# Patient Record
Sex: MALE | Race: WHITE | NOT HISPANIC OR LATINO | Employment: OTHER | URBAN - NONMETROPOLITAN AREA
[De-identification: names, ages, dates, MRNs, and addresses within clinical notes are randomized per-mention and may not be internally consistent; named-entity substitution may affect disease eponyms.]

---

## 2017-10-10 ENCOUNTER — OFFICE VISIT (OUTPATIENT)
Dept: URGENT CARE | Facility: CLINIC | Age: 77
End: 2017-10-10
Payer: MEDICARE

## 2017-10-10 PROCEDURE — G0463 HOSPITAL OUTPT CLINIC VISIT: HCPCS

## 2017-10-10 PROCEDURE — 99203 OFFICE O/P NEW LOW 30 MIN: CPT

## 2017-10-14 NOTE — PROGRESS NOTES
Assessment  1  Contact dermatitis due to poison ivy (692 6) (L23 7)    Plan  Contact dermatitis due to poison ivy    · MethylPREDNISolone 4 MG Oral Tablet Therapy Pack (Medrol); 24mg day 1 then  decrease by 4 mg po daily for 6 days    Discussion/Summary  Discussion Summary:   Discussed dx of poision ivy will treat with medrol dose pack and follow up with PCP in 1-2 days  Medication Side Effects Reviewed: Possible side effects of new medications were reviewed with the patient/guardian today  Understands and agrees with treatment plan: The treatment plan was reviewed with the patient/guardian  The patient/guardian understands and agrees with the treatment plan   Counseling Documentation With Imm: The patient was counseled regarding instructions for management,-- patient and family education,-- importance of compliance with treatment  total time of encounter was 25 minutes-- and-- 10 minutes was spent counseling  Follow Up Instructions: Follow Up with your Primary Care Provider in 1-2 days  If your symptoms worsen, go to the nearest [place] [address] Emergency Department  Chief Complaint  1  Rash    History of Present Illness  HPI: 68year old male at urgent care with chief complaint of red raised rash to bilateral forearms for 2 days since cutting [place] busches [place] his property  He has been using OTC benadryl no improvement noted  Hospital Based Practices Required Assessment:   Pain Assessment   the patient states they do not have pain  Abuse And Domestic Violence Screen    Yes, the patient is safe at home  -- The patient states no one is hurting them  Depression And Suicide Screen  No, the patient has not had thoughts of hurting themself  No, the patient has not felt depressed in the past 7 days  Prefered Language is  [place]  Primary Language is  English  Readiness To Learn: Receptive  Barriers To Learning: none     Preferred Learning: verbal   Education Completed: disease/condition,-- medications-- and-- further treatment/follow-up   Teaching Method: verbal   Person Taught: patient   Evaluation Of Learning: verbalized/demonstrated understanding   Rash: Laura Haney presents with complaints of rash  Associated symptoms include skin blistering,-- skin bumps,-- pruritus-- and-- skin redness, but-- no cracking,-- no crusting,-- no draining,-- no skin dryness,-- no skin oiliness,-- no pain,-- no skin scaling,-- no skin swelling,-- no skin ulceration,-- no skin weeping,-- no excoriations,-- no fever,-- no fissuring,-- no nausea,-- no pustules,-- no purulent drainage-- and-- no serous discharge  Review of Systems  Focused-Male:   Constitutional: no fever or chills, feels well, no tiredness, no recent weight loss or weight gain  ENT: no complaints of earache, no loss of hearing, no nosebleeds or nasal discharge, no sore throat or hoarseness  Cardiovascular: no complaints of slow or fast heart rate, no chest pain, no palpitations, no leg claudication or lower extremity edema  Respiratory: no complaints of shortness of breath, no wheezing or cough, no dyspnea on exertion, no orthopnea or PND  Gastrointestinal: no complaints of abdominal pain, no constipation, no nausea or vomiting, no diarrhea or bloody stools  Genitourinary: no complaints of dysuria or incontinence, no hesitancy, no nocturia, no genital lesion, no inadequacy of penile erection  Musculoskeletal: no complaints of arthralgia, no myalgia, no joint swelling or stiffness, no limb pain or swelling  Integumentary: a rash, but-- as noted in HPI  Neurological: no complaints of headache, no confusion, no numbness or tingling, no dizziness or fainting  ROS Reviewed:   ROS reviewed  Active Problems  1  Encounter for removal of sutures (E71 32) (Z48 02)    Past Medical History  1  Encounter for examination for driving license (Z69 9) (X08 2)   2   Encounter for removal of sutures (V58 32) (Z48 02)  Active Problems And Past Medical History Reviewed: The active problems and past medical history were reviewed and updated today  Family History  Father    1  Family history of acute myocardial infarction (V17 3) (Z82 49)  Family History Reviewed: The family history was reviewed and updated today  Social History   · Being A Social Drinker   · Never A Smoker  Social History Reviewed: The social history was reviewed and updated today  The social history was reviewed and is unchanged  Surgical History  1  History of Hernia Repair   2  History Of Prior Surgery   3  History of Tonsillectomy With Adenoidectomy  Surgical History Reviewed: The surgical history was reviewed and updated today  Current Meds   1  Aspirin 81 MG Oral Tablet Delayed Release; Take 1 tablet daily as directed Recorded   2  Ibuprofen TABS; Therapy: (Recorded:10Oct2017) to Recorded  Medication List Reviewed: The medication list was reviewed and updated today  Allergies  1  No Known Drug Allergies    Vitals  Signs   Recorded: [id number] 07:28PM   Temperature: 98 5 F  Heart Rate: 100  Respiration: 20  Systolic: 179  Diastolic: 82  O2 Saturation: 96    Physical Exam    Constitutional   General appearance: No acute distress, well appearing and well nourished  Eyes   Conjunctiva and lids: No swelling, erythema, or discharge  Pupils and irises: Equal, round and reactive to light  Ears, Nose, Mouth, and Throat   External inspection of ears and nose: Normal     Otoscopic examination: Tympanic membrance translucent with normal light reflex  Canals patent without erythema  Nasal mucosa, septum, and turbinates: Normal without edema or erythema  Oropharynx: Normal with no erythema, edema, exudate or lesions  Pulmonary   Respiratory effort: No increased work of breathing or signs of respiratory distress  Auscultation of lungs: Clear to auscultation  Cardiovascular   Palpation of heart: Normal PMI, no thrills      Auscultation of heart: Normal rate and rhythm, normal S1 and S2, without murmurs  Examination of extremities for edema and/or varicosities: Normal     Abdomen   Abdomen: Non-tender, no masses  Liver and spleen: No hepatomegaly or splenomegaly  Lymphatic   Palpation of lymph nodes in neck: No lymphadenopathy  Musculoskeletal   Gait and station: Normal     Digits and nails: Normal without clubbing or cyanosis  Inspection/palpation of joints, bones, and muscles: Normal     Skin   Skin and subcutaneous tissue: Abnormal   Clinical impression: contact dermatitis (on a non-weight bearing surface and on both forearms )  Neurologic   Cranial nerves: Cranial nerves 2-12 intact  Reflexes: 2+ and symmetric  Sensation: No sensory loss      Psychiatric   Orientation to person, place and time: Normal     Mood and affect: Normal        Signatures   Electronically signed by : Katherine Hansen NP; Oct 10 2017  7:38PM EST                       (Author)    Electronically signed by : APOLINAR Hurtado ; Oct 13 2017  1:22PM EST                       (Co-author)

## 2019-02-28 ENCOUNTER — HOSPITAL ENCOUNTER (EMERGENCY)
Facility: HOSPITAL | Age: 79
End: 2019-02-28
Attending: EMERGENCY MEDICINE | Admitting: EMERGENCY MEDICINE
Payer: MEDICARE

## 2019-02-28 ENCOUNTER — APPOINTMENT (EMERGENCY)
Dept: CT IMAGING | Facility: HOSPITAL | Age: 79
End: 2019-02-28
Payer: MEDICARE

## 2019-02-28 ENCOUNTER — HOSPITAL ENCOUNTER (INPATIENT)
Facility: HOSPITAL | Age: 79
LOS: 1 days | Discharge: HOME | DRG: 200 | End: 2019-03-02
Attending: SURGERY | Admitting: SURGERY
Payer: MEDICARE

## 2019-02-28 ENCOUNTER — APPOINTMENT (EMERGENCY)
Dept: RADIOLOGY | Facility: HOSPITAL | Age: 79
End: 2019-02-28
Payer: MEDICARE

## 2019-02-28 VITALS
DIASTOLIC BLOOD PRESSURE: 71 MMHG | HEART RATE: 62 BPM | OXYGEN SATURATION: 96 % | RESPIRATION RATE: 18 BRPM | BODY MASS INDEX: 24.82 KG/M2 | SYSTOLIC BLOOD PRESSURE: 120 MMHG | WEIGHT: 167.55 LBS | TEMPERATURE: 97.7 F | HEIGHT: 69 IN

## 2019-02-28 DIAGNOSIS — S22.49XA RIB FRACTURES: ICD-10-CM

## 2019-02-28 DIAGNOSIS — J94.2 HEMOTHORAX ON LEFT: Primary | ICD-10-CM

## 2019-02-28 DIAGNOSIS — S22.42XD CLOSED FRACTURE OF MULTIPLE RIBS OF LEFT SIDE WITH ROUTINE HEALING, SUBSEQUENT ENCOUNTER: Primary | ICD-10-CM

## 2019-02-28 PROBLEM — S22.42XA CLOSED FRACTURE OF MULTIPLE RIBS OF LEFT SIDE: Status: ACTIVE | Noted: 2019-02-28

## 2019-02-28 LAB
ALBUMIN SERPL BCP-MCNC: 3.7 G/DL (ref 3.5–5)
ALP SERPL-CCNC: 66 U/L (ref 46–116)
ALT SERPL W P-5'-P-CCNC: 21 U/L (ref 12–78)
ANION GAP BLD CALC-SCNC: 15 MMOL/L (ref 4–13)
ANION GAP SERPL CALCULATED.3IONS-SCNC: 6 MMOL/L (ref 4–13)
APTT PPP: 30 SECONDS (ref 26–38)
AST SERPL W P-5'-P-CCNC: 26 U/L (ref 5–45)
ATRIAL RATE: 67 BPM
BASOPHILS # BLD AUTO: 0.03 THOUSANDS/ΜL (ref 0–0.1)
BASOPHILS NFR BLD AUTO: 0 % (ref 0–1)
BILIRUB SERPL-MCNC: 0.5 MG/DL (ref 0.2–1)
BUN BLD-MCNC: 28 MG/DL (ref 5–25)
BUN SERPL-MCNC: 24 MG/DL (ref 5–25)
CA-I BLD-SCNC: 1.12 MMOL/L (ref 1.12–1.32)
CALCIUM SERPL-MCNC: 8.7 MG/DL (ref 8.3–10.1)
CHLORIDE BLD-SCNC: 109 MMOL/L (ref 100–108)
CHLORIDE SERPL-SCNC: 105 MMOL/L (ref 100–108)
CO2 SERPL-SCNC: 28 MMOL/L (ref 21–32)
CREAT BLD-MCNC: 1 MG/DL (ref 0.6–1.3)
CREAT SERPL-MCNC: 1.11 MG/DL (ref 0.6–1.3)
EOSINOPHIL # BLD AUTO: 0.12 THOUSAND/ΜL (ref 0–0.61)
EOSINOPHIL NFR BLD AUTO: 1 % (ref 0–6)
ERYTHROCYTE [DISTWIDTH] IN BLOOD BY AUTOMATED COUNT: 13.4 % (ref 11.6–15.1)
GFR SERPL CREATININE-BSD FRML MDRD: 63 ML/MIN/1.73SQ M
GFR SERPL CREATININE-BSD FRML MDRD: 71 ML/MIN/1.73SQ M
GLUCOSE SERPL-MCNC: 138 MG/DL (ref 65–140)
GLUCOSE SERPL-MCNC: 139 MG/DL (ref 65–140)
HCT VFR BLD AUTO: 45 % (ref 36.5–49.3)
HCT VFR BLD CALC: 43 % (ref 36.5–49.3)
HGB BLD-MCNC: 14.2 G/DL (ref 12–17)
HGB BLDA-MCNC: 14.6 G/DL (ref 12–17)
IMM GRANULOCYTES # BLD AUTO: 0.06 THOUSAND/UL (ref 0–0.2)
IMM GRANULOCYTES NFR BLD AUTO: 0 % (ref 0–2)
INR PPP: 1.05 (ref 0.86–1.17)
LYMPHOCYTES # BLD AUTO: 2.51 THOUSANDS/ΜL (ref 0.6–4.47)
LYMPHOCYTES NFR BLD AUTO: 16 % (ref 14–44)
MCH RBC QN AUTO: 29.8 PG (ref 26.8–34.3)
MCHC RBC AUTO-ENTMCNC: 31.6 G/DL (ref 31.4–37.4)
MCV RBC AUTO: 94 FL (ref 82–98)
MONOCYTES # BLD AUTO: 1.08 THOUSAND/ΜL (ref 0.17–1.22)
MONOCYTES NFR BLD AUTO: 7 % (ref 4–12)
NEUTROPHILS # BLD AUTO: 11.53 THOUSANDS/ΜL (ref 1.85–7.62)
NEUTS SEG NFR BLD AUTO: 76 % (ref 43–75)
NRBC BLD AUTO-RTO: 0 /100 WBCS
P AXIS: 24 DEGREES
PCO2 BLD: 24 MMOL/L (ref 21–32)
PLATELET # BLD AUTO: 315 THOUSANDS/UL (ref 149–390)
PMV BLD AUTO: 9.6 FL (ref 8.9–12.7)
POTASSIUM BLD-SCNC: 5.2 MMOL/L (ref 3.5–5.3)
POTASSIUM SERPL-SCNC: 4.5 MMOL/L (ref 3.5–5.3)
PR INTERVAL: 160 MS
PROT SERPL-MCNC: 7.4 G/DL (ref 6.4–8.2)
PROTHROMBIN TIME: 13.2 SECONDS (ref 11.8–14.2)
QRS AXIS: -14 DEGREES
QRSD INTERVAL: 86 MS
QT INTERVAL: 410 MS
QTC INTERVAL: 433 MS
RBC # BLD AUTO: 4.77 MILLION/UL (ref 3.88–5.62)
SODIUM BLD-SCNC: 142 MMOL/L (ref 136–145)
SODIUM SERPL-SCNC: 139 MMOL/L (ref 136–145)
SPECIMEN SOURCE: ABNORMAL
T WAVE AXIS: -47 DEGREES
VENTRICULAR RATE: 67 BPM
WBC # BLD AUTO: 15.33 THOUSAND/UL (ref 4.31–10.16)

## 2019-02-28 PROCEDURE — 85014 HEMATOCRIT: CPT

## 2019-02-28 PROCEDURE — 85025 COMPLETE CBC W/AUTO DIFF WBC: CPT | Performed by: EMERGENCY MEDICINE

## 2019-02-28 PROCEDURE — 1124F ACP DISCUSS-NO DSCNMKR DOCD: CPT | Performed by: SURGERY

## 2019-02-28 PROCEDURE — 80047 BASIC METABLC PNL IONIZED CA: CPT

## 2019-02-28 PROCEDURE — 99285 EMERGENCY DEPT VISIT HI MDM: CPT

## 2019-02-28 PROCEDURE — 99219 PR INITIAL OBSERVATION CARE/DAY 50 MINUTES: CPT | Performed by: SURGERY

## 2019-02-28 PROCEDURE — 72125 CT NECK SPINE W/O DYE: CPT

## 2019-02-28 PROCEDURE — 93010 ELECTROCARDIOGRAM REPORT: CPT | Performed by: INTERNAL MEDICINE

## 2019-02-28 PROCEDURE — 70450 CT HEAD/BRAIN W/O DYE: CPT

## 2019-02-28 PROCEDURE — 36415 COLL VENOUS BLD VENIPUNCTURE: CPT | Performed by: EMERGENCY MEDICINE

## 2019-02-28 PROCEDURE — 85730 THROMBOPLASTIN TIME PARTIAL: CPT | Performed by: EMERGENCY MEDICINE

## 2019-02-28 PROCEDURE — 71260 CT THORAX DX C+: CPT

## 2019-02-28 PROCEDURE — 80053 COMPREHEN METABOLIC PANEL: CPT | Performed by: EMERGENCY MEDICINE

## 2019-02-28 PROCEDURE — 99284 EMERGENCY DEPT VISIT MOD MDM: CPT

## 2019-02-28 PROCEDURE — 71045 X-RAY EXAM CHEST 1 VIEW: CPT

## 2019-02-28 PROCEDURE — 74177 CT ABD & PELVIS W/CONTRAST: CPT

## 2019-02-28 PROCEDURE — 85610 PROTHROMBIN TIME: CPT | Performed by: EMERGENCY MEDICINE

## 2019-02-28 PROCEDURE — 93005 ELECTROCARDIOGRAM TRACING: CPT

## 2019-02-28 RX ORDER — ONDANSETRON 2 MG/ML
4 INJECTION INTRAMUSCULAR; INTRAVENOUS EVERY 4 HOURS PRN
Status: DISCONTINUED | OUTPATIENT
Start: 2019-02-28 | End: 2019-03-02 | Stop reason: HOSPADM

## 2019-02-28 RX ORDER — OXYCODONE HYDROCHLORIDE 5 MG/1
5 TABLET ORAL EVERY 4 HOURS PRN
Status: DISCONTINUED | OUTPATIENT
Start: 2019-02-28 | End: 2019-03-02 | Stop reason: HOSPADM

## 2019-02-28 RX ORDER — DOCUSATE SODIUM 100 MG/1
100 CAPSULE, LIQUID FILLED ORAL 2 TIMES DAILY
Status: DISCONTINUED | OUTPATIENT
Start: 2019-03-01 | End: 2019-03-02 | Stop reason: HOSPADM

## 2019-02-28 RX ORDER — ACETAMINOPHEN 325 MG/1
975 TABLET ORAL EVERY 8 HOURS SCHEDULED
Status: DISCONTINUED | OUTPATIENT
Start: 2019-02-28 | End: 2019-03-02 | Stop reason: HOSPADM

## 2019-02-28 RX ORDER — LIDOCAINE 50 MG/G
1 PATCH TOPICAL DAILY
Status: DISPENSED | OUTPATIENT
Start: 2019-03-01 | End: 2019-03-02

## 2019-02-28 RX ORDER — OXYCODONE HYDROCHLORIDE 5 MG/1
2.5 TABLET ORAL EVERY 4 HOURS PRN
Status: DISCONTINUED | OUTPATIENT
Start: 2019-02-28 | End: 2019-03-02 | Stop reason: HOSPADM

## 2019-02-28 RX ORDER — HYDROMORPHONE HCL/PF 1 MG/ML
0.2 SYRINGE (ML) INJECTION EVERY 4 HOURS PRN
Status: DISCONTINUED | OUTPATIENT
Start: 2019-02-28 | End: 2019-03-02 | Stop reason: HOSPADM

## 2019-02-28 RX ORDER — SODIUM CHLORIDE, SODIUM GLUCONATE, SODIUM ACETATE, POTASSIUM CHLORIDE, MAGNESIUM CHLORIDE, SODIUM PHOSPHATE, DIBASIC, AND POTASSIUM PHOSPHATE .53; .5; .37; .037; .03; .012; .00082 G/100ML; G/100ML; G/100ML; G/100ML; G/100ML; G/100ML; G/100ML
75 INJECTION, SOLUTION INTRAVENOUS CONTINUOUS
Status: DISCONTINUED | OUTPATIENT
Start: 2019-02-28 | End: 2019-03-02 | Stop reason: HOSPADM

## 2019-02-28 RX ADMIN — ACETAMINOPHEN 975 MG: 325 TABLET ORAL at 23:36

## 2019-02-28 RX ADMIN — IOHEXOL 100 ML: 350 INJECTION, SOLUTION INTRAVENOUS at 16:23

## 2019-02-28 RX ADMIN — SODIUM CHLORIDE, SODIUM GLUCONATE, SODIUM ACETATE, POTASSIUM CHLORIDE, MAGNESIUM CHLORIDE, SODIUM PHOSPHATE, DIBASIC, AND POTASSIUM PHOSPHATE 75 ML/HR: .53; .5; .37; .037; .03; .012; .00082 INJECTION, SOLUTION INTRAVENOUS at 23:36

## 2019-02-28 NOTE — TRAUMA DOCUMENTATION
[facility] crew at bedside to transport patient to [place] ED, Accepting physician is Dr Neto Grover, Phone # for report is [phone]

## 2019-02-28 NOTE — EMTALA/ACUTE CARE TRANSFER
[address]  [address] [facility] [address]  Dept: [phone]      EMTALA TRANSFER CONSENT    NAME John Lucio                                         1940                              MRN [id number]    I have been informed of my rights regarding examination, treatment, and transfer   by Dr Rae Luis: Specialized equipment and/or services available at the receiving facility (Include comment)________________________(trauma)    Risks: Potential for delay in receiving treatment, Potential deterioration of medical condition, Loss of IV, Increased discomfort during transfer, Possible worsening of condition or death during transfer      Consent for Transfer:  I acknowledge that my medical condition has been evaluated and explained to me by the emergency department physician or other qualified medical person and/or my attending physician, who has recommended that I be transferred to the service of  Accepting Physician: (Dr Mena Soto) at [address], [facility] ([facility]  The above potential benefits of such transfer, the potential risks associated with such transfer, and the probable risks of not being transferred have been explained to me, and I fully understand them  The doctor has explained that, in my case, the benefits of transfer outweigh the risks  I agree to be transferred  I authorize the performance of emergency medical procedures and treatments upon me in both transit and upon arrival at the receiving facility  Additionally, I authorize the release of any and all medical records to the receiving facility and request they be transported with me, if possible  I understand that the safest mode of transportation during a medical emergency is an ambulance and that the Hospital advocates the use of this mode of transport   Risks of traveling to the receiving facility by car, including absence of medical control, life sustaining equipment, such as oxygen, and medical personnel has been explained to me and I fully understand them  (KIRILL CORRECT BOX BELOW)  [  ]  I consent to the stated transfer and to be transported by ambulance/helicopter  [  ]  I consent to the stated transfer, but refuse transportation by ambulance and accept full responsibility for my transportation by car  I understand the risks of non-ambulance transfers and I exonerate the Hospital and its staff from any deterioration in my condition that results from this refusal     X___________________________________________    DATE  19  TIME________  Signature of patient or legally responsible individual signing on patient behalf           RELATIONSHIP TO PATIENT_________________________          Provider Certification    NAME Allen Plascencia                                        [facility] 1940                              MRN [id number]    A medical screening exam was performed on the above named patient  Based on the examination:    Condition Necessitating Transfer The primary encounter diagnosis was Hemothorax on left  A diagnosis of Rib fractures was also pertinent to this visit      Patient Condition: The patient has been stabilized such that within reasonable medical probability, no material deterioration of the patient condition or the condition of the unborn child(jose r) is likely to result from the transfer    Reason for Transfer: Level of Care needed not available at this facility    Transfer Requirements: Facility ([facility])   · Space available and qualified personnel available for treatment as acknowledged by    · Agreed to accept transfer and to provide appropriate medical treatment as acknowledged by       (Dr Sudha Aguilar)  · Appropriate medical records of the examination and treatment of the patient are provided at the time of transfer   [address] _______  · Transfer will be performed by qualified personnel from    and appropriate transfer equipment as required, including the use of necessary and appropriate life support measures  Provider Certification: I have examined the patient and explained the following risks and benefits of being transferred/refusing transfer to the patient/family:  General risk, such as traffic hazards, adverse weather conditions, rough terrain or turbulence, possible failure of equipment (including vehicle or aircraft), or consequences of actions of persons outside the control of the transport personnel, Unanticipated needs of medical equipment and personnel during transport, Risk of worsening condition, The possibility of a transport vehicle being unavailable      Based on these reasonable risks and benefits to the patient and/or the unborn child(jose r), and based upon the information available at the time of the patients examination, I certify that the medical benefits reasonably to be expected from the provision of appropriate medical treatments at another medical facility outweigh the increasing risks, if any, to the individuals medical condition, and in the case of labor to the unborn child, from effecting the transfer      X____________________________________________ DATE 02/28/19        TIME_______      ORIGINAL - SEND TO MEDICAL RECORDS   COPY - SEND WITH PATIENT DURING TRANSFER

## 2019-02-28 NOTE — EMTALA/ACUTE CARE TRANSFER
[address] [facility] [address]  [address] [facility] [address] [facility] [address]  Dept: [phone]      EMTALA TRANSFER CONSENT    NAME John Lucio                                         1940                              MRN [id number]    I have been informed of my rights regarding examination, treatment, and transfer   by Dr Rae Blackwellr:      Risks:        Consent for Transfer:  I acknowledge that my medical condition has been evaluated and explained to me by the emergency department physician or other qualified medical person and/or my attending physician, who has recommended that I be transferred to the service of  Accepting Physician: (Dr Mena Soto) at [address], [address] : ([facility])  The above potential benefits of such transfer, the potential risks associated with such transfer, and the probable risks of not being transferred have been explained to me, and I fully understand them  The doctor has explained that, in my case, the benefits of transfer outweigh the risks  I agree to be transferred  I authorize the performance of emergency medical procedures and treatments upon me in both transit and upon arrival at the receiving facility  Additionally, I authorize the release of any and all medical records to the receiving facility and request they be transported with me, if possible  I understand that the safest mode of transportation during a medical emergency is an ambulance and that the Hospital advocates the use of this mode of transport  Risks of traveling to the receiving facility by car, including absence of medical control, life sustaining equipment, such as oxygen, and medical personnel has been explained to me and I fully understand them  (KIRILL CORRECT BOX BELOW)  [  ]  I consent to the stated transfer and to be transported by ambulance/helicopter    [  ]  I consent to the stated transfer, but refuse transportation by ambulance and accept full responsibility for my transportation by car  I understand the risks of non-ambulance transfers and I exonerate the Hospital and its staff from any deterioration in my condition that results from this refusal     X___________________________________________    DATE  19  TIME________  Signature of patient or legally responsible individual signing on patient behalf           RELATIONSHIP TO PATIENT_________________________          Provider Certification    NAME Gilma Perez                                         1940                              MRN [id number]    A medical screening exam was performed on the above named patient  Based on the examination:    Condition Necessitating Transfer The primary encounter diagnosis was Hemothorax on left  A diagnosis of Rib fractures was also pertinent to this visit  Patient Condition:      Reason for Transfer:      Transfer Requirements: Facility     · Space available and qualified personnel available for treatment as acknowledged by    · Agreed to accept transfer and to provide appropriate medical treatment as acknowledged by          · Appropriate medical records of the examination and treatment of the patient are provided at the time of transfer   [address] [facility] _______  · Transfer will be performed by qualified personnel from    and appropriate transfer equipment as required, including the use of necessary and appropriate life support measures      Provider Certification: I have examined the patient and explained the following risks and benefits of being transferred/refusing transfer to the patient/family:         Based on these reasonable risks and benefits to the patient and/or the unborn child(jose r), and based upon the information available at the time of the patients examination, I certify that the medical benefits reasonably to be expected from the provision of appropriate medical treatments at another [facility] facility outweigh the increasing risks, if any, to the individuals medical condition, and in the case of labor to the unborn child, from effecting the transfer      X____________________________________________ DATE 02/28/19        TIME_______      ORIGINAL - SEND TO MEDICAL RECORDS   COPY - SEND WITH PATIENT DURING TRANSFER

## 2019-02-28 NOTE — ED PROVIDER NOTES
Pt Name: Darlyn Bartholomew  MRN: [id number]  [place] 1940  Age/Sex: 78 y o  male  Date of evaluation: 2/28/2019  PCP: Amira Kinsey, [address] [place] [address]    Chief Complaint   Patient presents with    Trauma     Trauma alert, see trauma narrator  L lower anterior rib injury, AMS as per family unable to confirm details, pt had period of incontinence and doesnt remember doing it  HPI    Dee Sylvester presents to the Emergency Department complaining of left sided chest pain  He had a slip and fall on ice several days ago  Then, a few days ago, he fell indoors and hit his left chest wall on the coffee table  Today he lifted a heavy palette and suddenly felt a pop and had more pain and some SOB  HPI      Past Medical and Surgical History    Past Medical History:   Diagnosis Date    Arthritis        Past Surgical History:   Procedure Laterality Date    HERNIA REPAIR      JOINT REPLACEMENT         History reviewed  No pertinent family history  Social History     Tobacco Use    Smoking status: Never Smoker    Smokeless tobacco: Never Used   Substance Use Topics    Alcohol use: No    Drug use: No              Allergies    No Known Allergies    Home Medications    Prior to Admission medications    Medication Sig Start Date End Date Taking? Authorizing Provider   ibuprofen (MOTRIN) 200 mg tablet Take 200 mg by mouth every 6 (six) hours as needed for mild pain    Historical Provider, MD           Review of Systems    Review of Systems   Constitutional: Negative for activity change, appetite change, chills, fatigue and fever  HENT: Negative for congestion, rhinorrhea, sinus pressure, sneezing, sore throat and trouble swallowing  Eyes: Negative for photophobia and visual disturbance  Respiratory: Positive for cough  Negative for chest tightness, shortness of breath and wheezing  Cardiovascular: Negative for chest pain and leg swelling     Gastrointestinal: Negative for abdominal distention, abdominal pain, constipation, diarrhea, nausea and vomiting  Endocrine: Negative for polydipsia, polyphagia and polyuria  Genitourinary: Negative for decreased urine volume, difficulty urinating, dysuria, flank pain, frequency and urgency  Musculoskeletal: Negative for back pain, gait problem, joint swelling and neck pain  Skin: Negative for color change, pallor and rash  Allergic/Immunologic: Negative for immunocompromised state  Neurological: Negative for seizures, syncope, speech difficulty, weakness, light-headedness and headaches  Psychiatric/Behavioral: Negative for confusion  All other systems reviewed and are negative  Physical Exam      ED Triage Vitals [02/28/19 1533]   Temperature Pulse Respirations Blood Pressure SpO2   97 7 °F (36 5 °C) 78 20 146/80 99 %      Temp Source Heart Rate Source Patient Position - Orthostatic VS BP Location FiO2 (%)   Temporal Monitor Lying Right arm --      Pain Score       7               Physical Exam   Constitutional: He is oriented to person, place, and time  He appears well-developed and well-nourished  No distress  HENT:   Head: Normocephalic and atraumatic  Nose: Nose normal    Mouth/Throat: Oropharynx is clear and moist    Eyes: Pupils are equal, round, and reactive to light  Conjunctivae and EOM are normal    Neck: Normal range of motion  Neck supple  Cardiovascular: Normal rate, regular rhythm and normal heart sounds  Exam reveals no gallop and no friction rub  No murmur heard  Pulmonary/Chest: Effort normal  No respiratory distress  He has decreased breath sounds in the left lower field  He has no wheezes  He has no rales  He exhibits tenderness  He exhibits no crepitus, no edema and no swelling  Abdominal: Soft  Bowel sounds are normal  There is no tenderness  There is no rebound and no guarding  Musculoskeletal: Normal range of motion  Neurological: He is alert and oriented to person, place, and time  Skin: Skin is warm and dry  He is not diaphoretic  Psychiatric: He has a normal mood and affect  His behavior is normal    Nursing note and vitals reviewed  Assessment and Plan    Chrissy Montoya is a 78 y o  male who presents with left sided chest pain  Physical examination remarkable for left sided tenderness and diminished breath sounds  Differential diagnosis (not completely inclusive) includes rib fractures/ hemothrorax/ pneumothorax  Plan will be to perform diagnostic testing and treat symptomatically  MDM    Diagnostic Results    EKG INTERPRETATION  EKG Interpretation    Rate: 67 BPM  Rhythm: sinus  Axis: normal  Intervals: Normal, no blocks, QTc 433ms  T waves: nonspecific  ST segments: nonspecific    Impression: nondiagnostic EKG  EKG for comparison: not immediately available  EKG interpreted by me  Interpretation by Sueellen Runner, DO  EKG reviewed and interpreted independently      Labs:    Results for orders placed or performed during the hospital encounter of 02/28/19   CBC and differential   Result Value Ref Range    WBC 15 33 (H) 4 31 - 10 16 Thousand/uL    RBC 4 77 3 88 - 5 62 Million/uL    Hemoglobin 14 2 12 0 - 17 0 g/dL    Hematocrit 45 0 36 5 - 49 3 %    MCV 94 82 - 98 fL    MCH 29 8 26 8 - 34 3 pg    MCHC 31 6 31 4 - 37 4 g/dL    RDW 13 4 11 6 - 15 1 %    MPV 9 6 8 9 - 12 7 fL    Platelets 872 181 - 233 Thousands/uL    nRBC 0 /100 WBCs    Neutrophils Relative 76 (H) 43 - 75 %    Immat GRANS % 0 0 - 2 %    Lymphocytes Relative 16 14 - 44 %    Monocytes Relative 7 4 - 12 %    Eosinophils Relative 1 0 - 6 %    Basophils Relative 0 0 - 1 %    Neutrophils Absolute 11 53 (H) 1 85 - 7 62 Thousands/µL    Immature Grans Absolute 0 06 0 00 - 0 20 Thousand/uL    Lymphocytes Absolute 2 51 0 60 - 4 47 Thousands/µL    Monocytes Absolute 1 08 0 17 - 1 22 Thousand/µL    Eosinophils Absolute 0 12 0 00 - 0 61 Thousand/µL    Basophils Absolute 0 03 0 00 - 0 10 Thousands/µL   Comprehensive metabolic panel   Result Value Ref Range    Sodium 139 136 - 145 mmol/L    Potassium 4 5 3 5 - 5 3 mmol/L    Chloride 105 100 - 108 mmol/L    CO2 28 21 - 32 mmol/L    ANION GAP 6 4 - 13 mmol/L    BUN 24 5 - 25 mg/dL    Creatinine 1 11 0 60 - 1 30 mg/dL    Glucose 139 65 - 140 mg/dL    Calcium 8 7 8 3 - 10 1 mg/dL    AST 26 5 - 45 U/L    ALT 21 12 - 78 U/L    Alkaline Phosphatase 66 46 - 116 U/L    Total Protein 7 4 6 4 - 8 2 g/dL    Albumin 3 7 3 5 - 5 0 g/dL    Total Bilirubin 0 50 0 20 - 1 00 mg/dL    eGFR 63 ml/min/1 73sq m   Protime-INR   Result Value Ref Range    Protime 13 2 11 8 - 14 2 seconds    INR 1 05 0 86 - 1 17   APTT   Result Value Ref Range    PTT 30 26 - 38 seconds   ECG 12 lead   Result Value Ref Range    Ventricular Rate 67 BPM    Atrial Rate 67 BPM    GA Interval 160 ms    QRSD Interval 86 ms    QT Interval 410 ms    QTC Interval 433 ms    P Axis 24 degrees    QRS Axis -14 degrees    T Wave Axis -47 degrees   POCT Chem 8+   Result Value Ref Range    SODIUM, I-STAT 142 136 - 145 mmol/l    Potassium, i-STAT 5 2 3 5 - 5 3 mmol/L    Chloride, istat 109 (H) 100 - 108 mmol/L    CO2, i-STAT 24 21 - 32 mmol/L    Anion Gap, i-STAT 15 (H) 4 - 13 mmol/L    Calcium, Ionized i-STAT 1 12 1 12 - 1 32 mmol/L    BUN, I-STAT 28 (H) 5 - 25 mg/dl    Creatinine, i-STAT 1 0 0 6 - 1 3 mg/dl    eGFR 71 ml/min/1 73sq m    Glucose, i-STAT 138 65 - 140 mg/dl    Hct, i-STAT 43 36 5 - 49 3 %    Hgb, i-STAT 14 6 12 0 - 17 0 g/dl    Specimen Type VENOUS        All labs reviewed and utilized in the medical decision making process    Radiology:    CT recon only thoracolumbar   Final Result      No fracture or traumatic subluxation  Workstation performed: [id number]         CT chest abdomen pelvis w contrast   Final Result   1  Minimally displaced fractures of the left posterior lateral 8th and 9th ribs  There is a left-sided hemopneumothorax and left basilar opacity  Left basilar opacity has appearance most suggestive atelectasis  Pulmonary contusion is less likely    given the appearance  2   No evident pneumothorax  3   Bilateral nonobstructing renal calculi  4   Colonic diverticulosis without evidence of acute diverticulitis  The study was marked in [facility] for immediate notification  Workstation performed: [id number]         CT cervical spine without contrast   Final Result         1  No cervical spine fracture or traumatic malalignment  Cervical spine degenerative changes  2   Partially visualized left pleural hyperdense collection may represent hemothorax  CT chest abdomen pelvis report  Workstation performed: [id number]         CT head without contrast   Final Result      No acute intracranial abnormality  Workstation performed: [id number]         XR chest 1 view portable    (Results Pending)     Left sided effusion    All radiology studies independently viewed by me and interpreted by the radiologist     Procedure    Procedures    [facility]      ED Course of Care and Re-Assessments    I discussed case with trauma who will accept patient in transfer  Medications   iohexol (OMNIPAQUE) 350 MG/ML injection (SINGLE-DOSE) 100 mL (100 mL Intravenous Given 2/28/19 1623)           FINAL IMPRESSION    Final diagnoses:   Hemothorax on left   Rib fractures         DISPOSITION/PLAN      Time reflects when diagnosis was documented in both MDM as applicable and the Disposition within this note     Time User Action Codes Description Comment    2/28/2019  5:38 PM Dirk Rising L Add [J94 2] Hemothorax on left     2/28/2019  5:38 PM Dirk Rising Add [S22 39XA] Rib fractures       ED Disposition     ED Disposition Condition Date/Time Comment    Transfer to Another Facility-In Network  Thu Feb 28, 2019  5:38 PM Minta Camp should be transferred out to [facility]           MD Documentation      Most Recent Value   Patient Condition  The patient has been stabilized such that within reasonable medical probability, no material deterioration of the patient condition or the condition of the unborn child(jose r) is likely to result from the transfer   Reason for Transfer  Level of Care needed not available at this facility   Benefits of Transfer  Specialized equipment and/or services available at the receiving facility (Include comment)________________________ [trauma]   Risks of Transfer  Potential for delay in receiving treatment, Potential deterioration of medical condition, Loss of IV, Increased discomfort during transfer, Possible worsening of condition or death during transfer   Accepting Physician  -- [Dr Castillo [facility]   -- [  [facility]  -- [Dr Nell Vieyra   Provider Certification  General risk, such as traffic hazards, adverse weather conditions, rough terrain or turbulence, possible failure of equipment (including vehicle or aircraft), or consequences of actions of persons outside the control of the transport personnel, Unanticipated needs of medical equipment and personnel during transport, Risk of worsening condition, The possibility of a transport vehicle being unavailable      RN Documentation      Most [facility], [facility]   -- [[facility]]      Follow-up Information    None           PATIENT REFERRED TO:    No follow-up provider specified  DISCHARGE MEDICATIONS:    Discharge Medication List as of 2/28/2019  6:38 PM      CONTINUE these medications which have NOT CHANGED    Details   ibuprofen (MOTRIN) 200 mg tablet Take 200 mg by mouth every 6 (six) hours as needed for mild pain, Until Discontinued, Historical Med             No discharge procedures on file           Natalie Gomes, [address] [place] [address],   02/28/19 3261

## 2019-03-01 ENCOUNTER — APPOINTMENT (OUTPATIENT)
Dept: RADIOLOGY | Facility: HOSPITAL | Age: 79
DRG: 200 | End: 2019-03-01
Payer: MEDICARE

## 2019-03-01 LAB
ANION GAP SERPL CALCULATED.3IONS-SCNC: 5 MMOL/L (ref 4–13)
APTT PPP: 32 SECONDS (ref 26–38)
BASOPHILS # BLD AUTO: 0.02 THOUSANDS/ΜL (ref 0–0.1)
BASOPHILS NFR BLD AUTO: 0 % (ref 0–1)
BUN SERPL-MCNC: 20 MG/DL (ref 5–25)
CALCIUM SERPL-MCNC: 8.2 MG/DL (ref 8.3–10.1)
CHLORIDE SERPL-SCNC: 109 MMOL/L (ref 100–108)
CO2 SERPL-SCNC: 26 MMOL/L (ref 21–32)
CREAT SERPL-MCNC: 0.98 MG/DL (ref 0.6–1.3)
EOSINOPHIL # BLD AUTO: 0.1 THOUSAND/ΜL (ref 0–0.61)
EOSINOPHIL NFR BLD AUTO: 1 % (ref 0–6)
ERYTHROCYTE [DISTWIDTH] IN BLOOD BY AUTOMATED COUNT: 13.6 % (ref 11.6–15.1)
GFR SERPL CREATININE-BSD FRML MDRD: 73 ML/MIN/1.73SQ M
GLUCOSE FLD-MCNC: 119 MG/DL
GLUCOSE SERPL-MCNC: 93 MG/DL (ref 65–140)
HCT VFR BLD AUTO: 38.1 % (ref 36.5–49.3)
HGB BLD-MCNC: 12.1 G/DL (ref 12–17)
IMM GRANULOCYTES # BLD AUTO: 0.02 THOUSAND/UL (ref 0–0.2)
IMM GRANULOCYTES NFR BLD AUTO: 0 % (ref 0–2)
INR PPP: 1.06 (ref 0.86–1.17)
LYMPHOCYTES # BLD AUTO: 1.97 THOUSANDS/ΜL (ref 0.6–4.47)
LYMPHOCYTES NFR BLD AUTO: 23 % (ref 14–44)
LYMPHOCYTES NFR BLD AUTO: 33 %
MCH RBC QN AUTO: 29.9 PG (ref 26.8–34.3)
MCHC RBC AUTO-ENTMCNC: 31.8 G/DL (ref 31.4–37.4)
MCV RBC AUTO: 94 FL (ref 82–98)
MONOCYTES # BLD AUTO: 0.86 THOUSAND/ΜL (ref 0.17–1.22)
MONOCYTES NFR BLD AUTO: 10 % (ref 4–12)
MONOCYTES NFR BLD AUTO: 9 %
NEUTROPHILS # BLD AUTO: 5.69 THOUSANDS/ΜL (ref 1.85–7.62)
NEUTS SEG NFR BLD AUTO: 58 %
NEUTS SEG NFR BLD AUTO: 66 % (ref 43–75)
NRBC BLD AUTO-RTO: 0 /100 WBCS
PH BODY FLUID: 7.4
PLATELET # BLD AUTO: 243 THOUSANDS/UL (ref 149–390)
PMV BLD AUTO: 10 FL (ref 8.9–12.7)
POTASSIUM SERPL-SCNC: 4.5 MMOL/L (ref 3.5–5.3)
PROT FLD-MCNC: 5.1 G/DL
PROTHROMBIN TIME: 13.9 SECONDS (ref 11.8–14.2)
RBC # BLD AUTO: 4.05 MILLION/UL (ref 3.88–5.62)
SITE: NORMAL
SODIUM SERPL-SCNC: 140 MMOL/L (ref 136–145)
TOTAL CELLS COUNTED SPEC: 100
WBC # BLD AUTO: 8.66 THOUSAND/UL (ref 4.31–10.16)
WBC # FLD MANUAL: 5051 /UL

## 2019-03-01 PROCEDURE — G8989 SELF CARE D/C STATUS: HCPCS

## 2019-03-01 PROCEDURE — 83986 ASSAY PH BODY FLUID NOS: CPT | Performed by: SURGERY

## 2019-03-01 PROCEDURE — G8988 SELF CARE GOAL STATUS: HCPCS

## 2019-03-01 PROCEDURE — 94760 N-INVAS EAR/PLS OXIMETRY 1: CPT

## 2019-03-01 PROCEDURE — 85610 PROTHROMBIN TIME: CPT | Performed by: EMERGENCY MEDICINE

## 2019-03-01 PROCEDURE — 97166 OT EVAL MOD COMPLEX 45 MIN: CPT

## 2019-03-01 PROCEDURE — 87070 CULTURE OTHR SPECIMN AEROBIC: CPT | Performed by: SURGERY

## 2019-03-01 PROCEDURE — 89051 BODY FLUID CELL COUNT: CPT | Performed by: SURGERY

## 2019-03-01 PROCEDURE — 80048 BASIC METABOLIC PNL TOTAL CA: CPT | Performed by: EMERGENCY MEDICINE

## 2019-03-01 PROCEDURE — 85025 COMPLETE CBC W/AUTO DIFF WBC: CPT | Performed by: EMERGENCY MEDICINE

## 2019-03-01 PROCEDURE — G8987 SELF CARE CURRENT STATUS: HCPCS

## 2019-03-01 PROCEDURE — 84157 ASSAY OF PROTEIN OTHER: CPT | Performed by: SURGERY

## 2019-03-01 PROCEDURE — 85730 THROMBOPLASTIN TIME PARTIAL: CPT | Performed by: EMERGENCY MEDICINE

## 2019-03-01 PROCEDURE — 82945 GLUCOSE OTHER FLUID: CPT | Performed by: SURGERY

## 2019-03-01 PROCEDURE — 32555 ASPIRATE PLEURA W/ IMAGING: CPT | Performed by: RADIOLOGY

## 2019-03-01 PROCEDURE — 99232 SBSQ HOSP IP/OBS MODERATE 35: CPT | Performed by: NURSE PRACTITIONER

## 2019-03-01 PROCEDURE — 87205 SMEAR GRAM STAIN: CPT | Performed by: SURGERY

## 2019-03-01 PROCEDURE — 0W9B3ZZ DRAINAGE OF LEFT PLEURAL CAVITY, PERCUTANEOUS APPROACH: ICD-10-PCS | Performed by: RADIOLOGY

## 2019-03-01 PROCEDURE — 88112 CYTOPATH CELL ENHANCE TECH: CPT | Performed by: PATHOLOGY

## 2019-03-01 PROCEDURE — 71046 X-RAY EXAM CHEST 2 VIEWS: CPT

## 2019-03-01 RX ADMIN — ACETAMINOPHEN 975 MG: 325 TABLET ORAL at 21:11

## 2019-03-01 NOTE — RESPIRATORY THERAPY NOTE
RT Protocol Note  Tl Kulkarni 78 y o  male MRN: [id number]  Unit/Bed#: [facility] 623-01 Encounter: [id number]    Assessment    Principal Problem:    Closed fracture of multiple ribs of left side  Active Problems:    Hemothorax on left      Home Pulmonary Medications:  none       Past Medical History:   Diagnosis Date    Arthritis      Social History     Socioeconomic History    Marital status: /Civil Union     Spouse name: None    Number of children: None    Years of education: None    Highest education level: None   Occupational History    None   Social Needs    Financial resource strain: None    Food insecurity:     Worry: None     Inability: None    Transportation needs:     Medical: None     Non-medical: None   Tobacco Use    Smoking status: Never Smoker    Smokeless tobacco: Never Used   Substance and Sexual Activity    Alcohol use: Not Currently    Drug use: Not Currently    Sexual activity: None   Lifestyle    Physical activity:     Days per week: None     Minutes per session: None    Stress: None   Relationships    Social connections:     Talks on phone: None     Gets together: None     Attends [employer] service: None     Active member of club or organization: None     Attends meetings of clubs or organizations: None     Relationship status: None    Intimate partner violence:     Fear of current or ex partner: None     Emotionally abused: None     Physically abused: None     Forced sexual activity: None   Other Topics Concern    None   Social History Narrative    None       Subjective         Objective    Physical Exam:   Assessment Type: Assess only  General Appearance: Awake, Alert  Respiratory Pattern: Normal  Chest Assessment: Chest expansion symmetrical  Bilateral Breath Sounds: Clear  Cough: None    Vitals:  Blood pressure 130/71, pulse 77, temperature 98 2 °F (36 8 °C), resp  rate 20, weight 76 kg (167 lb 8 8 oz), SpO2 97 %            Imaging and other studies: I have personally reviewed pertinent reports  Plan       Airway Clearance Plan: Discontinue Protocol     Resp Comments: Pt able to reach goal on IS and will continue independently   Will d/c airway clearance protocol at this time

## 2019-03-01 NOTE — UTILIZATION REVIEW
Initial Clinical Review    Admission: Date/Time/Statement: Observation 2/28 and changed to Inpatient on 3/1 @ 1515    Transfer from [facility] ED    Admitting Physician [address] Surg    Bed Type Trauma    Estimated length of stay More than 2 Midnights    Certification I certify that inpatient services are medically necessary for this patient for a duration of greater than two midnights  See H&P and MD Progress Notes for additional information about the patient's course of treatment  ED: Date/Time/Mode of Arrival:   ED Arrival Information     Expected Arrival Acuity Means of Arrival Escorted By Service Admission Type    2/28/2019 2/28/2019 19:40 Emergent Ambulance [address]    Arrival Complaint    rib fx hemothorax        Chief Complaint:   Chief Complaint   Patient presents with    Rib Pain     see trauma flow sheet     History of Illness: 78 y o  male who presented originally to [facility] after sustaining multiple falls  Five days ago the patient was walking outside, slipped on ice and fell on his back  ED Vital Signs:   ED Triage Vitals   Temperature Pulse Respirations Blood Pressure SpO2   02/28/19 1945 02/28/19 1945 02/28/19 1945 02/28/19 1945 02/28/19 1945   97 9 °F (36 6 °C) 75 18 139/77 96 %      Temp Source Heart Rate Source Patient Position - Orthostatic VS BP Location FiO2 (%)   02/28/19 1945 02/28/19 1945 02/28/19 1945 02/28/19 2325 --   Oral Monitor Lying Left arm       Pain Score       02/28/19 2000       5        Wt Readings from Last 1 Encounters:   02/28/19 76 kg (167 lb 8 8 oz)     Vital Signs (abnormal): WNL    Pertinent Labs/Diagnostic Test Results:   CT Head - No acute intracranial abnormality  CT Chest/ Abd/ Pelvis -   Minimally displaced fractures of the left posterior lateral 8th and 9th ribs  There is a left-sided hemopneumothorax and left basilar opacity    Left basilar opacity has appearance most suggestive atelectasis  Pulmonary contusion is less likely given the appearance  No evident pneumothorax  Bilateral nonobstructing renal calculi  Colonic diverticulosis without evidence of acute diverticulitis  Wbc = 15 33     ED Treatment:   Medication Administration from 02/28/2019 1845 to 02/28/2019 2247     None        Past Medical/Surgical History: Active Ambulatory Problems     Diagnosis Date Noted    No Active Ambulatory Problems     Resolved Ambulatory Problems     Diagnosis Date Noted    No Resolved Ambulatory Problems     Past Medical History:   Diagnosis Date    Arthritis      Admitting Diagnosis: Rib pain [R07 81]     Age/Sex: 78 y o  male     Assessment/Plan:   66y Male, transfer from [facility] ED with after multiple falls with rib pain  Five days ago the patient was walking outside, slipped on ice and fell on his back, did not hit his head or lose consciousness but is left sided rib cage struck the ground forcefully  He had moderate pain at that time but was controlled with at-home medications  The following day he fell again after tripping on the carpet in hitting his left rib cage on a coffee table  He again did not hit his head or lose consciousness  He takes no anticoagulation or anti-platelet agents  Today when attempting to lift a heavy [place] and he felt a pop in his left rib cage and worsening pain which ultimately led him to the emergency department  CT scan imaging showed minimally displaced fractures on the left lateral 8th and 9th rib with associated hemothorax  Trauma Active Problems:   Fall from standing  Left-sided multiple rib fractures, 8, 9th  Left-sided hemothorax    Plan:  Rib fracture protocol  Pain control  Continuous pulse oximetry  IR consult for hemothorax drainage given no immediate indication for chest tube placement  IS  P r n   Nasal cannula oxygenation to maintain saturation greater than 94%  PT/OT    3/1 IR consult  Thoracentesis    Admission Orders:  NPO; Sips with meds  IR consult  PT/OT eval and treat    Scheduled Meds:   Current Facility-Administered Medications:  acetaminophen 975 mg Oral Q8H [address]   docusate sodium 100 mg Oral BID   lidocaine 1 patch Topical Daily     Continuous Infusions:   multi-electrolyte 75 mL/hr Last Rate: 75 mL/hr (02/28/19 5172)     PRN Meds: HYDROmorphone    ondansetron    oxyCODONE    ------------------------------------------------------------------------------------------    3/1 Progress notes:  Left Hemothorax/ Closed fracture of mutiple ribs of left side  IR ocnsulted  For chest tube with IR today

## 2019-03-01 NOTE — BRIEF OP NOTE (RAD/CATH)
Left thoracentesis  Procedure Note    PATIENT NAME: John Lucio  : 1940  MRN: [id number]     Pre-op Diagnosis: Hemothorax  Post-op Diagnosis: Hemothorax    Surgeon:   Dayanara Contreras MD    Estimated Blood Loss: None    Findings: Successful left thoracentesis with 750 mL bloody pleural fluid removed      Specimens: Pleural fluid sample sent to lab    Complications:  None    Anesthesia: Local    Dayanara Contreras MD     Date: 3/1/2019  Time: 5:26 PM

## 2019-03-01 NOTE — PROGRESS NOTES
Progress Note - Ronda Vicente 1940, 78 y o  male MRN: [id number]    Unit/Bed#: [place] [address] Encounter: [id number]    Primary Care Provider: Sharri Durham DO   Date and time admitted to hospital: 2/28/2019  7:40 PM        Hemothorax on left  Assessment & Plan  IR ocnsulted  For chest tube with IR today    * Closed fracture of multiple ribs of left side  Assessment & Plan  Incentive spirometer  Pulmonary toilet  CXR  Pain management      Progress Note - Trauma   Ronda Vicente 78 y o  male MRN: [id number]  Unit/Bed#: [facility] 623-01 Encounter: [id number]    Assessment: S/P Fall  Multiple rib fracture  RAHUL    Plan:   Admit to trauma  IS  Pulmonary toilet  Therapy  IR consult  Pain management  DVT prophylaxis    Chief Complaint: none    Subjective: I fell twice at home    Objective: OOB in a chair  Meds/Allergies   Medications Prior to Admission   Medication Sig Dispense Refill Last Dose    ibuprofen (MOTRIN) 200 mg tablet Take 200 mg by mouth every 6 (six) hours as needed for mild pain          Vitals: Blood pressure 130/71, pulse 77, temperature 98 2 °F (36 8 °C), resp  rate 20, weight 76 kg (167 lb 8 8 oz), SpO2 97 %  Body mass index is 24 74 kg/m²       ABG: No results found for: PHART, OCT9XNH, PO2ART, UZZ5RTK, D9YIRTRE, BEART, SOURCE      Intake/Output Summary (Last 24 hours) at 3/1/2019 1152  Last data filed at 3/1/2019 0955  Gross per 24 hour   Intake 773 75 ml   Output --   Net 773 75 ml       Invasive Devices     Peripheral Intravenous Line            Peripheral IV 02/28/19 Left Forearm less than 1 day    Peripheral IV 02/28/19 Right Antecubital less than 1 day                Nutrition/GI Proph/Bowel Reg: regular    Physical Exam:   GENERAL APPEARANCE: comfortable  HEENT: EOM's intact  CV: RRR, no complaint of chest pain  LUNGS: CTA bilaterally, no shortness of breath  ABD: soft and non-tender  EXT: moves all four extremities  NEURO: intact, GCS - 15  SKIN: warm and dry      Lab Results: Results for Raul Erika Bowels (MRN [id number]) as of 3/1/2019 11:58   Ref   Range 3/1/2019 05:25 3/1/2019 10:28   eGFR Latest Units: ml/min/1 73sq m 73    Sodium Latest Ref Range: 136 - 145 mmol/L 140    Potassium Latest Ref Range: 3 5 - 5 3 mmol/L 4 5    Chloride Latest Ref Range: 100 - 108 mmol/L 109 (H)    CO2 Latest Ref Range: 21 - 32 mmol/L 26    Anion Gap Latest Ref Range: 4 - 13 mmol/L 5    BUN Latest Ref Range: 5 - 25 mg/dL 20    Creatinine Latest Ref Range: 0 60 - 1 30 mg/dL 0 98    Glucose, Random Latest Ref Range: 65 - 140 mg/dL 93    Calcium Latest Ref Range: 8 3 - 10 1 mg/dL 8 2 (L)    WBC Latest Ref Range: 4 31 - 10 16 Thousand/uL 8 66    Red Blood Cell Count Latest Ref Range: 3 88 - 5 62 Million/uL 4 05    Hemoglobin Latest Ref Range: 12 0 - 17 0 g/dL 12 1    HCT Latest Ref Range: 36 5 - 49 3 % 38 1    MCV Latest Ref Range: 82 - 98 fL 94    MCH Latest Ref Range: 26 8 - 34 3 pg 29 9    MCHC Latest Ref Range: 31 4 - 37 4 g/dL 31 8    RDW Latest Ref Range: 11 6 - 15 1 % 13 6    Platelet Count Latest Ref Range: 149 - 390 Thousands/uL 243    MPV Latest Ref Range: 8 9 - 12 7 fL 10 0    nRBC Latest Units: /100 WBCs 0    Neutrophils % Latest Ref Range: 43 - 75 % 66    Immat GRANS % Latest Ref Range: 0 - 2 % 0    Lymphocytes Relative Latest Ref Range: 14 - 44 % 23    Monocytes Relative Latest Ref Range: 4 - 12 % 10    Eosinophils Latest Ref Range: 0 - 6 % 1    Basophils Relative Latest Ref Range: 0 - 1 % 0    Immature Grans Absolute Latest Ref Range: 0 00 - 0 20 Thousand/uL 0 02    Absolute Neutrophils Latest Ref Range: 1 85 - 7 62 Thousands/µL 5 69    Lymphocytes Absolute Latest Ref Range: 0 60 - 4 47 Thousands/µL 1 97    Absolute Monocytes Latest Ref Range: 0 17 - 1 22 Thousand/µL 0 86    Absolute Eosinophils Latest Ref Range: 0 00 - 0 61 Thousand/µL 0 10    Basophils Absolute Latest Ref Range: 0 00 - 0 10 Thousands/µL 0 02    XR CHEST PA & LATERAL Unknown  Rpt ((NONE))     Imaging/EKG Studies: CXR pending  Other Studies: none  VTE Prophylaxis: jacob        Nurse / Provider rounds completed with staff nurse, Param Pierre and trauma team

## 2019-03-01 NOTE — PHYSICAL THERAPY NOTE
PT Cancellation    PT orders received and chart reviewed  Pt is currently off the floor in IR  Will follow and see as able      Jani Mendez, PT, DPT

## 2019-03-01 NOTE — PLAN OF CARE
Problem: Potential for Falls  Goal: Patient will remain free of falls  Description  INTERVENTIONS:  - Assess patient frequently for physical needs  -  Identify cognitive and physical deficits and behaviors that affect risk of falls    -  [place] fall precautions as indicated by assessment   - Educate patient/family on patient safety including physical limitations  - Instruct patient to call for assistance with activity based on assessment  - Modify environment to reduce risk of injury  - Consider OT/PT consult to assist with strengthening/mobility  Outcome: Progressing     Problem: RESPIRATORY - ADULT  Goal: Achieves optimal ventilation and oxygenation  Description  INTERVENTIONS:  - Assess for changes in respiratory status  - Assess for changes in mentation and behavior  - Position to facilitate oxygenation and minimize respiratory effort  - Oxygen administration by appropriate delivery method based on oxygen saturation (per order) or ABGs  - Initiate smoking cessation education as indicated  - Encourage broncho-pulmonary hygiene including cough, deep breathe, Incentive Spirometry  - Assess the need for suctioning and aspirate as needed  - Assess and instruct to report SOB or any respiratory difficulty  - Respiratory Therapy support as indicated  Outcome: Progressing     Problem: PAIN - ADULT  Goal: Verbalizes/displays adequate comfort level or baseline comfort level  Description  Interventions:  - Encourage patient to monitor pain and request assistance  - Assess pain using appropriate pain scale  - Administer analgesics based on type and severity of pain and evaluate response  - Implement non-pharmacological measures as appropriate and evaluate response  - Consider cultural and social influences on pain and pain management  - Notify physician/advanced practitioner if interventions unsuccessful or patient reports new pain  Outcome: Progressing     Problem: SAFETY ADULT  Goal: Maintain or return to baseline ADL function  Description  INTERVENTIONS:  -  Assess patient's ability to carry out ADLs; assess patient's baseline for ADL function and identify physical deficits which impact ability to perform ADLs (bathing, care of mouth/teeth, toileting, grooming, dressing, etc )  - Assess/evaluate cause of self-care deficits   - Assess range of motion  - Assess patient's mobility; develop plan if impaired  - Assess patient's need for assistive devices and provide as appropriate  - Encourage maximum independence but intervene and supervise when necessary  ¯ Involve family in performance of ADLs  ¯ Assess for home care needs following discharge   ¯ Request OT consult to assist with ADL evaluation and planning for discharge  ¯ Provide patient education as appropriate  Outcome: Progressing  Goal: Maintain or return mobility status to optimal level  Description  INTERVENTIONS:  - Assess patient's baseline mobility status (ambulation, transfers, stairs, etc )    - Identify cognitive and physical deficits and behaviors that affect mobility  - Identify mobility aids required to assist with transfers and/or ambulation (gait belt, sit-to-stand, lift, walker, cane, etc )  - [place] fall precautions as indicated by assessment  - Record patient progress and toleration of activity level on Mobility SBAR; progress patient to next Phase/Stage  - Instruct patient to call for assistance with activity based on assessment  - Request Rehabilitation consult to assist with strengthening/weightbearing, etc   Outcome: Progressing     Problem: DISCHARGE PLANNING  Goal: Discharge to home or other facility with appropriate resources  Description  INTERVENTIONS:  - Identify barriers to discharge w/patient and caregiver  - Arrange for needed discharge resources and transportation as appropriate  - Identify discharge learning needs (meds, wound care, etc )  - Arrange for interpretive services to assist at discharge as needed  - Refer to Case Management Department for coordinating discharge planning if the patient needs post-hospital services based on physician/advanced practitioner order or complex needs related to functional status, cognitive ability, or social support system  Outcome: Progressing     Problem: Knowledge Deficit  Goal: Patient/family/caregiver demonstrates understanding of disease process, treatment plan, medications, and discharge instructions  Description  Complete learning assessment and assess knowledge base    Interventions:  - Provide teaching at level of understanding  - Provide teaching via preferred learning methods  Outcome: Progressing     Problem: DISCHARGE PLANNING - CARE MANAGEMENT  Goal: Discharge to post-acute care or home with appropriate resources  Description  INTERVENTIONS:  - Conduct assessment to determine patient/family and health care team treatment goals, and need for post-acute services based on payer coverage, community resources, and patient preferences, and barriers to discharge  - Address psychosocial, clinical, and financial barriers to discharge as identified in assessment in conjunction with the patient/family and health care team  - Arrange appropriate level of post-acute services according to patient?s   needs and preference and payer coverage in collaboration with the physician and health care team  - Communicate with and update the patient/family, physician, and health care team regarding progress on the discharge plan  - Arrange appropriate transportation to post-acute venues  Outcome: Progressing

## 2019-03-01 NOTE — PROGRESS NOTES
Per patient was taken to IR for a tap/  Bandain intact, no soreness, no redness  No note in chart as of yet     Patient states it was about a quart of drank fluid that came out  Will check CXR in am   No SOB noted

## 2019-03-01 NOTE — H&P
H&P Exam - Trauma   Chrissy Montoya 78 y o  male MRN: [id number]  Unit/Bed#: ED 09 Encounter: [id number]    Assessment/Plan   Trauma Alert: Evaluation  Model of Arrival: Transfer [facility]  Trauma Team: Attending Darlene Homans and Residents Aaliyah  Consultants: None    Trauma Active Problems:     Fall from standing  Left-sided multiple rib fractures, 8, 9th  Left-sided hemothorax    Trauma Plan:     Admit  Rib fracture protocol  Pain control  Continuous pulse oximetry  IR consult for hemothorax drainage given no immediate indication for chest tube placement  IS  P r n  Nasal cannula oxygenation to maintain saturation greater than 94%  PT/OT    Chief Complaint:  My ribs hurt    History of Present Illness   HPI:  Chrissy Montoya is a 78 y o  male who presented originally to [address] [place] [address] after sustaining multiple falls  Five days ago the patient was walking outside, slipped on ice and fell on his back, did not hit his head or lose consciousness but is left sided rib cage struck the ground forcefully  He had moderate pain at that time but was controlled with at-home medications  The following day he fell again after tripping on the carpet in hitting his left rib cage on a coffee table  He again did not hit his head or lose consciousness  He takes no anticoagulation or anti-platelet agents  Today when attempting to lift a heavy [place] and he felt a pop in his left rib cage and worsening pain which ultimately led him to the emergency department  CT scan imaging showed minimally displaced fractures on the left lateral 8th and 9th rib with associated hemothorax  The patient was hemodynamically stable required no supplemental oxygen had no conversational dyspnea  He denies any significant shortness of breath  Back pain or abdominal pain  No nausea vomiting  No headaches numbness tingling or weakness  Patient was admitted for further management  Mechanism:Fall    Review of Systems   Constitutional: Negative      HENT: Negative  Eyes: Negative  Respiratory: Negative  Cardiovascular: Negative  Gastrointestinal: Negative  Endocrine: Negative  Genitourinary: Negative  Musculoskeletal: Negative  Allergic/Immunologic: Negative  Neurological: Negative  Hematological: Negative  Psychiatric/Behavioral: Negative  Historical Information   History is unobtainable from the patient due to none  Efforts to obtain history included the following sources: other medical personnel, obtained from other records    Past Medical History:   Diagnosis Date    Arthritis      Past Surgical History:   Procedure Laterality Date    HERNIA REPAIR      JOINT REPLACEMENT       Social History   Social History     Substance and Sexual Activity   Alcohol Use No     Social History     Substance and Sexual Activity   Drug Use No     Social History     Tobacco Use   Smoking Status Never Smoker   Smokeless Tobacco Never Used     Immunization History   Administered Date(s) Administered    Influenza TIV (IM) 1940    Pneumococcal Polysaccharide PPV23 1940    Tdap 09/29/2016     Last Tetanus:  Unknown  Family History: Non-contributory  Unable to obtain/limited by none      Meds/Allergies   all current active meds have been reviewed    No Known Allergies      PHYSICAL EXAM    PE limited by:  None    Objective   Vitals:   First set: Temperature: 97 9 °F (36 6 °C) (02/28/19 1945)  Pulse: 75 (02/28/19 1945)  Respirations: 18 (02/28/19 1945)  Blood Pressure: 139/77 (02/28/19 1945)    Primary Survey:   (A) Airway:  Intact  (B) Breathing:  Bilateral breath sounds  (C) Circulation: Pulses:   normal  (D) Disabliity:  GCS Total:  15  (E) Expose:  Completed    Secondary Survey: (Click on Physical Exam tab above)  Physical Exam   Constitutional: He is oriented to person, place, and time  Vital signs are normal  He appears well-developed and well-nourished  No distress  HENT:   Head: Normocephalic and atraumatic   Head is without raccoon's eyes, without Santiago's sign, without abrasion and without contusion  Right Ear: Tympanic membrane normal    Left Ear: Tympanic membrane normal    Nose: Nose normal    Eyes: Pupils are equal, round, and reactive to light  Conjunctivae and EOM are normal    Neck: Trachea normal, normal range of motion, full passive range of motion without pain and phonation normal  Neck supple  No JVD present  No spinous process tenderness and no muscular tenderness present  Normal range of motion present  Cardiovascular: Normal rate, regular rhythm and intact distal pulses  No murmur heard  Pulmonary/Chest: Effort normal and breath sounds normal  He has no wheezes  He has no rales  Abdominal: Soft  Normal appearance and bowel sounds are normal  He exhibits no distension  There is no tenderness  Musculoskeletal: He exhibits no edema  Moves all extremities equally  No bony tenderness to palpation  No midline CT or L-spine tenderness   Lymphadenopathy:     He has no cervical adenopathy  Neurological: He is alert and oriented to person, place, and time  He has normal strength  He is not disoriented  No cranial nerve deficit or sensory deficit  He exhibits normal muscle tone  GCS eye subscore is 4  GCS verbal subscore is 5  GCS motor subscore is 6  Unremarkable cranial nerve exam  Pupils 4 mm equal round reactive to light  No nystagmus, normal extraocular motion  5 out of 5 upper and lower extremity strength  No subjective sensory deficits to the face, upper or lower extremity  Skin: Skin is warm  No rash noted  He is not diaphoretic  Psychiatric: He has a normal mood and affect  Vitals reviewed        Invasive Devices     Peripheral Intravenous Line            Peripheral IV 02/28/19 Left Forearm less than 1 day    Peripheral IV 02/28/19 Right Antecubital less than 1 day                Lab Results:   Results: I have personally reviewed pertinent reports   , BMP/CMP:   Lab Results Component Value Date    SODIUM 139 02/28/2019    K 4 5 02/28/2019     02/28/2019    CO2 24 02/28/2019    BUN 24 02/28/2019    CREATININE 1 11 02/28/2019    GLUCOSE 138 02/28/2019    CALCIUM 8 7 02/28/2019    AST 26 02/28/2019    ALT 21 02/28/2019    ALKPHOS 66 02/28/2019    EGFR 71 02/28/2019   , CBC:   Lab Results   Component Value Date    WBC 15 33 (H) 02/28/2019    HGB 14 6 02/28/2019    HCT 43 02/28/2019    MCV 94 02/28/2019     02/28/2019    MCH 29 8 02/28/2019    MCHC 31 6 02/28/2019    RDW 13 4 02/28/2019    MPV 9 6 02/28/2019    NRBC 0 02/28/2019    and Coagulation:   Lab Results   Component Value Date    INR 1 05 02/28/2019     Imaging/EKG Studies: Results: I have personally reviewed pertinent reports  Other Studies:     CTH - No acute intracranial abnormality  CT Cspine - 1   No cervical spine fracture or traumatic malalignment   Cervical spine degenerative changes  2   Partially visualized left pleural hyperdense collection may represent hemothorax   CT chest abdomen pelvis report  CT CAP - 1   Minimally displaced fractures of the left posterior lateral 8th and 9th ribs   There is a left-sided hemopneumothorax and left basilar opacity   Left basilar opacity has appearance most suggestive atelectasis   Pulmonary contusion is less likely   given the appearance  2   No evident pneumothorax  3   Bilateral nonobstructing renal calculi  4   Colonic diverticulosis without evidence of acute diverticulitis      Code Status: No Order  Advance Directive and Living Will:      Power of :    POLST:

## 2019-03-01 NOTE — OCCUPATIONAL THERAPY NOTE
[address]  Evaluation     Patient Name: Shane Hernandez  HMCQO'M Date: 3/1/2019  Problem List  Patient Active Problem List   Diagnosis    Closed fracture of multiple ribs of left side    Hemothorax on left     Past Medical History  Past Medical History:   Diagnosis Date    Arthritis      Past Surgical History  Past Surgical History:   Procedure Laterality Date    HERNIA REPAIR      JOINT REPLACEMENT           03/01/19 0900   Note Type   Note type Eval only   Restrictions/Precautions   Weight Bearing Precautions Per Order No   Other Precautions Pain; Fall Risk;Multiple lines   Pain Assessment   Pain Assessment 0-10   Pain Score 6   Pain Type Acute pain   Pain Location Rib cage   Pain Orientation Left   Patient's Stated Pain Goal No pain   Hospital Pain Intervention(s) Repositioned; Ambulation/increased activity; Distraction; Emotional support   Response to Interventions TOLERATED    Home Living   Type of [address] [place] [address] Two level;1/2 bath on main level;Stairs to enter with rails  (3 ERMIAS)   Bathroom Shower/Tub Tub/shower unit   Bathroom Toilet Standard   Additional Comments NO USE OF DME AT BASELINE    Prior Function   Level of [place] Independent with ADLs and functional mobility   Lives With [employer] Help From Family   ADL Assistance Independent   IADLs Independent   Falls in the last 6 months 1 to 4  (2- REASON FOR ADMISSION )   Vocational Retired   Lifestyle   Autonomy  [facility] ADLS/IADLS/DRIVING PTA  Reciprocal Relationships LIVES WITH SUPPORTIVE SPOUSE WHO PT REPORTS IS ABLE TO ASSIST AS NEEDED      Service to Others RETIRED; DOES SIDE JOBS WORKING WITH [employer]    Intrinsic Gratification ENJOYS PLAYING THE [employer]    Psychosocial   Psychosocial (WDL) WDL   ADL   Eating Assistance 6  Modified independent   Grooming Assistance 6  Modified Independent   UB Bathing Assistance 5  Supervision/Setup   LB Bathing Assistance 4  Minimal Assistance   UB Dressing Assistance 5  Supervision/Setup   LB Dressing Assistance 4  Minimal Assistance   Toileting Assistance  5  Supervision/Setup   Functional Assistance 5  Supervision/Setup   Bed Mobility   Additional Comments PT SITTING OOB UPON ARRIVAL    Transfers   Sit to Stand 5  Supervision   Additional items Impulsive;Verbal cues   Stand to Sit 5  Supervision   Additional items Impulsive;Verbal cues   Functional Mobility   Functional Mobility 5  Supervision   Balance   Static Sitting Good   Static Standing Fair +   Ambulatory Fair   Activity Tolerance   Activity Tolerance Patient tolerated treatment well   Medical Staff Made Aware WILL F/U WITH CM REGARDING D/C PLAN    Nurse Made Aware APPROPRIATE TO SEE PER CASANDRA GAFFNEY    RULAUREN Assessment   RUE Assessment   (LIMITED SHOULDER ROM 2' ARTHRITIS  ABLE TO COMPLETE ADLS)   LUE Assessment   LUE Assessment   (LIMITED SHOULDER ROM 2' ARTHRITIS  ABLE TO COMPLETE ADLS)   Sensation   Light Touch No apparent deficits   Cognition   Overall Cognitive Status WFL   Arousal/Participation Alert; Cooperative   Attention Attends with cues to redirect   Orientation Level Oriented X4   Memory Within functional limits   Following Commands Follows one step commands without difficulty   Comments PT IS PLEASANT AND COOPERATIVE  EASILY DISTRACTED BY CONVERSATION/STORY TELLING T/O SESSION, REQUIRING CUES TO REDIRECT  MILDLY IMPUSLIVE AT TIMES HOWEVER THIS BELIEVED TO BE 2' BEING FULLY INDEPENDENT AT BASELINE  Assessment   Assessment 77 YO Male SEEN FOR INITIAL OCCUPATIONAL THERAPY EVALUATION FOLLOWING ADMISSION TO [facility] S/P 2 FALLS IN THE PAST WEEK RESULTING IN LL-SIDE RIB PAIN IN ADDITION TO LIFTING A HEAVY PALLET AND FEELING A "POP" ON L-SIDE  DX INCLUDES L-SIDED HEMOTHORAX AND L SIDED RIB FX  PT REPORTS -HEADSTIKE/LOC  PROBLEMS LIST INCLUDES ARTHRITIS  PT IS FROM HOME WITH SPOUSE WHERE HE REPORTS BEING INDEPENDENT WITH ADLS/IADLS/DRIVING PTA   PT CURRENTLY REQUIRES OVERALL SUPERVISION-MIN A WITH ADLS, AND SUPERVISION WITH TRANSFERS /FUNCTIONAL MOBILITY INITIALLY WITH USE OF RW, PROGRESSING TO WITHOUT USE OF AD 2' PT REQUEST  PT IS EXPERIENCING EXPECTED LIMITATIONS 2' PAIN, IMPAIRED BALANCE, FALL RISK, EASILY DISTRACTED, MILDLY IMPULSIVE AT TIMES  and OVERALL LIMITED ACTIVITY TOLERANCE  PT EDUCATED ON DEEP BREATHING TECHNIQUES T/O ACTIVITY, SLOWING OF PACE, ENERGY CONSERVATION TECHNIQUES FOR CARRY OVER UPON D/C, INCREASED FAMILY SUPPORT and CONTINUE PARTICIPATION IN SELF-CARE/MOBILITY WITH STAFF [address]   PT REPORTS SPOUSE IS ABLE TO ASSIST WITH LB ADLS UPON RETURN HOME  FROM AN OCCUPATIONAL THERAPY PERSPECTIVE, PT CAN RETURN HOME WITH INCREASED FAMILY SUPPORT WHEN MEDICALLY CLEARED  ALL CURRENT QUESTIONS/CONCERNS ADDRESSED  NO ADDITIONAL ACUTE CARE OT NEEDS  D/C OT      Goals   Patient Goals TO RETURN HOME/ TO BE ABLE TO EAT    Recommendation   OT Discharge Recommendation Home with family support   OT - [place] to Discharge Yes   [place] Index   Feeding 10   Bathing 0   Grooming Score 5   Dressing Score 5   Bladder Score 10   Bowels Score 10   Toilet Use Score 10   Transfers (Bed/Chair) Score 10   Mobility (Level Surface) Score 10   Stairs Score 0   [place] Index Score 70   Modified [place] Scale   Modified [place] Scale 3       Documentation completed by Anna Villareal, PAULETTE, OTR/L

## 2019-03-01 NOTE — PLAN OF CARE
Problem: Potential for Falls  Goal: Patient will remain free of falls  Description  INTERVENTIONS:  - Assess patient frequently for physical needs  -  Identify cognitive and physical deficits and behaviors that affect risk of falls    -  [place] fall precautions as indicated by assessment   - Educate patient/family on patient safety including physical limitations  - Instruct patient to call for assistance with activity based on assessment  - Modify environment to reduce risk of injury  - Consider OT/PT consult to assist with strengthening/mobility  Outcome: Progressing     Problem: RESPIRATORY - ADULT  Goal: Achieves optimal ventilation and oxygenation  Description  INTERVENTIONS:  - Assess for changes in respiratory status  - Assess for changes in mentation and behavior  - Position to facilitate oxygenation and minimize respiratory effort  - Oxygen administration by appropriate delivery method based on oxygen saturation (per order) or ABGs  - Initiate smoking cessation education as indicated  - Encourage broncho-pulmonary hygiene including cough, deep breathe, Incentive Spirometry  - Assess the need for suctioning and aspirate as needed  - Assess and instruct to report SOB or any respiratory difficulty  - Respiratory Therapy support as indicated  Outcome: Progressing     Problem: PAIN - ADULT  Goal: Verbalizes/displays adequate comfort level or baseline comfort level  Description  Interventions:  - Encourage patient to monitor pain and request assistance  - Assess pain using appropriate pain scale  - Administer analgesics based on type and severity of pain and evaluate response  - Implement non-pharmacological measures as appropriate and evaluate response  - Consider cultural and social influences on pain and pain management  - Notify physician/advanced practitioner if interventions unsuccessful or patient reports new pain  Outcome: Progressing     Problem: SAFETY ADULT  Goal: Maintain or return to baseline ADL function  Description  INTERVENTIONS:  -  Assess patient's ability to carry out ADLs; assess patient's baseline for ADL function and identify physical deficits which impact ability to perform ADLs (bathing, care of mouth/teeth, toileting, grooming, dressing, etc )  - Assess/evaluate cause of self-care deficits   - Assess range of motion  - Assess patient's mobility; develop plan if impaired  - Assess patient's need for assistive devices and provide as appropriate  - Encourage maximum independence but intervene and supervise when necessary  ¯ Involve family in performance of ADLs  ¯ Assess for home care needs following discharge   ¯ Request OT consult to assist with ADL evaluation and planning for discharge  ¯ Provide patient education as appropriate  Outcome: Progressing  Goal: Maintain or return mobility status to optimal level  Description  INTERVENTIONS:  - Assess patient's baseline mobility status (ambulation, transfers, stairs, etc )    - Identify cognitive and physical deficits and behaviors that affect mobility  - Identify mobility aids required to assist with transfers and/or ambulation (gait belt, sit-to-stand, lift, walker, cane, etc )  - [place] fall precautions as indicated by assessment  - Record patient progress and toleration of activity level on Mobility SBAR; progress patient to next Phase/Stage  - Instruct patient to call for assistance with activity based on assessment  - Request Rehabilitation consult to assist with strengthening/weightbearing, etc   Outcome: Progressing     Problem: DISCHARGE PLANNING  Goal: Discharge to home or other facility with appropriate resources  Description  INTERVENTIONS:  - Identify barriers to discharge w/patient and caregiver  - Arrange for needed discharge resources and transportation as appropriate  - Identify discharge learning needs (meds, wound care, etc )  - Arrange for interpretive services to assist at discharge as needed  - Refer to Case Management Department for coordinating discharge planning if the patient needs post-hospital services based on physician/advanced practitioner order or complex needs related to functional status, cognitive ability, or social support system  Outcome: Progressing     Problem: Knowledge Deficit  Goal: Patient/family/caregiver demonstrates understanding of disease process, treatment plan, medications, and discharge instructions  Description  Complete learning assessment and assess knowledge base    Interventions:  - Provide teaching at level of understanding  - Provide teaching via preferred learning methods  Outcome: Progressing

## 2019-03-01 NOTE — SOCIAL WORK
CM met with pt to discuss the role of CM  Pt lives with his wife in a 2 story home which has 2STE and 16 steps inside  Pt is retired, drives, and was fully independent PTA  Pt owns no DME or living will  Pt's pharmacy is [facility] in [place]  Pt reports no hx of mental health, substance abuse, IP rehab, or VNA  Pt's family will transport home  Pt was cleared for home by OT but will wait for PT recs  CM reviewed d/c planning process including the following: identifying help at home, patient preference for d/c planning needs, Discharge Lounge, [facility] Meds to Bed program, availability of treatment team to discuss questions or concerns patient and/or family may have regarding understanding medications and recognizing signs and symptoms once discharged  CM also encouraged patient to follow up with all recommended appointments after discharge  Patient advised of importance for patient and family to participate in managing patients medical well being

## 2019-03-01 NOTE — RESPIRATORY THERAPY NOTE
RT Protocol Note  Dimitri Giron 78 y o  male MRN: [id number]  Unit/Bed#: [facility] 623-01 Encounter: [id number]    Assessment    Principal Problem:    Closed fracture of multiple ribs of left side  Active Problems:    Hemothorax on left      Home Pulmonary Medications:         Past Medical History:   Diagnosis Date    Arthritis      Social History     Socioeconomic History    Marital status: /Civil Union     Spouse name: None    Number of children: None    Years of education: None    Highest education level: None   Occupational History    None   Social Needs    Financial resource strain: None    Food insecurity:     Worry: None     Inability: None    Transportation needs:     Medical: None     Non-medical: None   Tobacco Use    Smoking status: Never Smoker    Smokeless tobacco: Never Used   Substance and Sexual Activity    Alcohol use: Not Currently    Drug use: Not Currently    Sexual activity: None   Lifestyle    Physical activity:     Days per week: None     Minutes per session: None    Stress: None   Relationships    Social connections:     Talks on phone: None     Gets together: None     Attends [employer] service: None     Active member of club or organization: None     Attends meetings of clubs or organizations: None     Relationship status: None    Intimate partner violence:     Fear of current or ex partner: None     Emotionally abused: None     Physically abused: None     Forced sexual activity: None   Other Topics Concern    None   Social History Narrative    None       Subjective         Objective    Physical Exam:   Assessment Type: Assess only  General Appearance: Awake  Respiratory Pattern: Normal  Chest Assessment: Chest expansion symmetrical  Bilateral Breath Sounds: Clear, Diminished  Cough: None    Vitals:  Blood pressure 110/75, pulse 73, temperature 98 2 °F (36 8 °C), temperature source Oral, resp  rate 16, weight 76 kg (167 lb 8 8 oz), SpO2 95 %            Imaging and other studies: I have personally reviewed pertinent reports  Plan       Airway Clearance Plan: Incentive Spirometer     Resp Comments: Pt admit s/p fall, L sided rib fx's, pneumothorax  Pt w/o pulm hx  Neg tob hx  Pt in no acute respiratory distress  Ordered for Airway Clearence Protocol for chest trauma  Plan IS Q1 X 12hrs

## 2019-03-01 NOTE — RESPIRATORY THERAPY NOTE
RT Protocol Note  Britt Angel 78 y o  male MRN: [id number]  Unit/Bed#: [facility] 623-01 Encounter: [id number]    Assessment    Principal Problem:    Closed fracture of multiple ribs of left side  Active Problems:    Hemothorax on left      Home Pulmonary Medications:  reviewed       Past Medical History:   Diagnosis Date    Arthritis      Social History     Socioeconomic History    Marital status: /Civil Union     Spouse name: None    Number of children: None    Years of education: None    Highest education level: None   Occupational History    None   Social Needs    Financial resource strain: None    Food insecurity:     Worry: None     Inability: None    Transportation needs:     Medical: None     Non-medical: None   Tobacco Use    Smoking status: Never Smoker    Smokeless tobacco: Never Used   Substance and Sexual Activity    Alcohol use: Not Currently    Drug use: Not Currently    Sexual activity: None   Lifestyle    Physical activity:     Days per week: None     Minutes per session: None    Stress: None   Relationships    Social connections:     Talks on phone: None     Gets together: None     Attends Pent[employer]al service: None     Active member of club or organization: None     Attends meetings of clubs or organizations: None     Relationship status: None    Intimate partner violence:     Fear of current or ex partner: None     Emotionally abused: None     Physically abused: None     Forced sexual activity: None   Other Topics Concern    None   Social History Narrative    None       Subjective         Objective    Physical Exam:   Assessment Type: Assess only  General Appearance: Awake, Alert  Respiratory Pattern: Normal  Chest Assessment: Chest expansion symmetrical  Bilateral Breath Sounds: Clear  Cough: None    Vitals:  Blood pressure 138/70, pulse 79, temperature 98 2 °F (36 8 °C), resp  rate 18, weight 76 kg (167 lb 8 8 oz), SpO2 93 %            Imaging and other studies: I have personally reviewed pertinent reports  Plan       Airway Clearance Plan: Discontinue Protocol     Resp Comments: (P) Pt no longer requires respiratory to monitor and assisst IS therapy  Protocol discontinued

## 2019-03-01 NOTE — CONSULTS
IR Consult Note    HPI:  78year old male with left rib fractures and left hemothorax is referred for thoracentesis  PMH:  Left rib fractures  RA    PSH:  N/A    Physical exam:  Gen: NAD  Pulm: No resp distress    Coags ok    A/P:  78year old male with left rib fractures and left hemothorax is referred for thoracentesis      - Left thoracentesis

## 2019-03-02 ENCOUNTER — APPOINTMENT (INPATIENT)
Dept: RADIOLOGY | Facility: HOSPITAL | Age: 79
DRG: 200 | End: 2019-03-02
Payer: MEDICARE

## 2019-03-02 VITALS
SYSTOLIC BLOOD PRESSURE: 114 MMHG | WEIGHT: 167.55 LBS | DIASTOLIC BLOOD PRESSURE: 67 MMHG | BODY MASS INDEX: 24.74 KG/M2 | HEART RATE: 68 BPM | RESPIRATION RATE: 18 BRPM | OXYGEN SATURATION: 94 % | TEMPERATURE: 98.4 F

## 2019-03-02 PROCEDURE — G8978 MOBILITY CURRENT STATUS: HCPCS

## 2019-03-02 PROCEDURE — 94762 N-INVAS EAR/PLS OXIMTRY CONT: CPT

## 2019-03-02 PROCEDURE — G8979 MOBILITY GOAL STATUS: HCPCS

## 2019-03-02 PROCEDURE — 99238 HOSP IP/OBS DSCHRG MGMT 30/<: CPT | Performed by: NURSE PRACTITIONER

## 2019-03-02 PROCEDURE — 97163 PT EVAL HIGH COMPLEX 45 MIN: CPT

## 2019-03-02 PROCEDURE — 71046 X-RAY EXAM CHEST 2 VIEWS: CPT

## 2019-03-02 RX ORDER — ACETAMINOPHEN 325 MG/1
975 TABLET ORAL EVERY 8 HOURS SCHEDULED
Qty: 30 TABLET | Refills: 0 | Status: SHIPPED | OUTPATIENT
Start: 2019-03-02

## 2019-03-02 RX ORDER — OXYCODONE HYDROCHLORIDE 5 MG/1
2.5 TABLET ORAL EVERY 4 HOURS PRN
Qty: 30 TABLET | Refills: 0 | Status: SHIPPED | OUTPATIENT
Start: 2019-03-02 | End: 2019-03-12

## 2019-03-02 RX ADMIN — ACETAMINOPHEN 325 MG: 325 TABLET ORAL at 05:48

## 2019-03-02 NOTE — DISCHARGE INSTRUCTIONS
Rib Fracture   WHAT YOU NEED TO KNOW:   A rib fracture is a crack or break in a rib bone  Rib fractures usually heal within 6 weeks  You should be able to return to normal activities before that time  Do not wrap anything around your body to try to splint your ribs  This can prevent you from taking deep breaths and increases your risk for pneumonia  DISCHARGE INSTRUCTIONS:   Call 911 for any of the following:   · You have trouble breathing  · You have new or increased pain  Return to the emergency department if:   · Your pain does not get better, even after treatment  · You have a fever or a cough  Contact your healthcare provider if:   · You have questions or concerns about your condition or care  Medicines:   · NSAIDs  help decrease swelling and pain  NSAIDs are available without a doctor's order  Ask your healthcare provider which medicine is right for you  Ask how much to take and when to take it  Take as directed  NSAIDs can cause stomach bleeding and kidney problems if not taken correctly  · Prescription pain medicine  may be given  Ask your healthcare provider how to take this medicine safely  Some prescription pain medicines contain acetaminophen  Do not take other medicines that contain acetaminophen without talking to your healthcare provider  Too much acetaminophen may cause liver damage  Prescription pain medicine may cause constipation  Ask your healthcare provider how to prevent or treat constipation  · Take your medicine as directed  Contact your healthcare provider if you think your medicine is not helping or if you have side effects  Tell him or her if you are allergic to any medicine  Keep a list of the medicines, vitamins, and herbs you take  Include the amounts, and when and why you take them  Bring the list or the pill bottles to follow-up visits  Carry your medicine list with you in case of an emergency    Follow up with your healthcare provider as directed:  Write down your questions so you remember to ask them during your visits  Deep breathing:  Deep breathing will decrease your risk for pneumonia  Hug a pillow on the injured side while doing this exercise, to decrease pain  Take a deep breath and hold it for as long as possible  You should let the air out and then cough strongly  Deep breaths help open your airway  You may be given an incentive spirometer to help take deep breaths  Put the plastic piece in your mouth  Take a slow, deep breath  You should then let the air out and cough  Repeat these steps 10 times every hour  Rest:  Rest and limit activity to decrease swelling and pain, and allow your injury to heal  Avoid activities that may cause more pain or damage to your ribs such as, pulling, pushing, and lifting  As your pain decreases, begin movements slowly  Take short walks between rest periods  Ice:  Apply ice on the fractured area for 15 to 20 minutes every hour or as directed  Use an ice pack or put crushed ice in a plastic bag  Cover it with a towel  Ice helps prevent tissue damage and decreases swelling and pain  © 2017 [facility] Information is for End User's use only and may not be sold, redistributed or otherwise used for commercial purposes  All illustrations and images included in CareNotes® are the copyrighted property of A D A M , Inc  or Raymond Koehler  The above information is an  only  It is not intended as medical advice for individual conditions or treatments  Talk to your doctor, nurse or pharmacist before following any medical regimen to see if it is safe and effective for you

## 2019-03-02 NOTE — PROGRESS NOTES
Progress Note - Lizzie Joseph 1940, 78 y o  male MRN: [id number]    Unit/Bed#: [facility] 623-01 Encounter: [id number]    Primary Care Provider: Kunal Barragan DO   Date and time admitted to hospital: 2/28/2019  7:40 PM        Hemothorax on left  Assessment & Plan  IR ocnsulted  For IR did Thoracentesis only  Patient much improved    * Closed fracture of multiple ribs of left side  Assessment & Plan  Incentive spirometer - up to 2200  Pulmonary toilet  CXR - pending this morning  Pain management - controlled      Progress Note - Trauma   Lizzie Joseph 78 y o  male MRN: [id number]  Unit/Bed#: [facility] 623-01 Encounter: [id number]    Assessment: S/P Fall X 2  Multiple rib fractures  RAHUL    Plan: Thoracentesis successful  Repeat CXR today  Passed PT/OT  D/C home when medically clear    Chief Complaint: rib pain    Subjective: I feel so much better today    Objective: cheerful, comfortable    Meds/Allergies   Medications Prior to Admission   Medication Sig Dispense Refill Last Dose    ibuprofen (MOTRIN) 200 mg tablet Take 200 mg by mouth every 6 (six) hours as needed for mild pain          Vitals: Blood pressure 100/60, pulse 63, temperature 98 4 °F (36 9 °C), temperature source Oral, resp  rate 18, weight 76 kg (167 lb 8 8 oz), SpO2 96 %  Body mass index is 24 74 kg/m²       ABG: No results found for: PHART, QFP8ALY, PO2ART, AUG3FKR, K9QHGNIN, BEART, SOURCE      Intake/Output Summary (Last 24 hours) at 3/2/2019 0739  Last data filed at 3/2/2019 5379  Gross per 24 hour   Intake 1957 5 ml   Output 1050 ml   Net 907 5 ml       Invasive Devices     Peripheral Intravenous Line            Peripheral IV 02/28/19 Right Antecubital 1 day                Nutrition/GI Proph/Bowel Reg: regular    Physical Exam:   GENERAL APPEARANCE: comfortable  HEENT: EOM's intact  CV: RRR, no complaint of chest pain  LUNGS: CTA bilaterally, no shortness of breath  ABD: soft, tolerating a diet  EXT: moving all four extremities  NEURO: GCS - 15  SKIN: warm and dry      Lab Results: none  Imaging/EKG Studies: CXR pending this morning  Other Studies: none  VTE Prophylaxis: SCD's      Nurse / Provider rounds completed with staff nurse, and patient

## 2019-03-02 NOTE — ASSESSMENT & PLAN NOTE
Incentive spirometer - up to 2200  Pulmonary toilet  CXR - pending this morning  Pain management - controlled

## 2019-03-02 NOTE — DISCHARGE SUMMARY
Discharge Summary - Mitch Kearns 78 y o  male MRN: [id number]    Unit/Bed#: [facility] 623-01 Encounter: [id number]    Admission Date:   Admission Orders (From admission, onward)    Ordered        03/01/19 1515  Inpatient Admission  Once     Order ID Start Status   [id number] 03/01/19 1515 Completed          02/28/19 2047  Place in Observation  Once     Order ID Start Status   [id number] 02/28/19 2044 Completed                Admitting Diagnosis: Rib pain [R07 81]    HPI: per resident: JIM Fischer:  " Mitch Kearns is a 78 y o  male who presented originally to [address] [place] [address] after sustaining multiple falls  Five days ago the patient was walking outside, slipped on ice and fell on his back, did not hit his head or lose consciousness but is left sided rib cage struck the ground forcefully  He had moderate pain at that time but was controlled with at-home medications  The following day he fell again after tripping on the carpet in hitting his left rib cage on a coffee table  He again did not hit his head or lose consciousness  He takes no anticoagulation or anti-platelet agents  Today when attempting to lift a heavy [place] and he felt a pop in his left rib cage and worsening pain which ultimately led him to the emergency department  CT scan imaging showed minimally displaced fractures on the left lateral 8th and 9th rib with associated hemothorax  The patient was hemodynamically stable required no supplemental oxygen had no conversational dyspnea  He denies any significant shortness of breath  Back pain or abdominal pain  No nausea vomiting  No headaches numbness tingling or weakness  Patient was admitted for further management"        Procedures Performed: No orders of the defined types were placed in this encounter  Summary of Hospital Course: 77 y/o male admitted after two falls over a 3 day period  On arrival he had moderate pain in his chest and CXR demonstrated a small collection    IR consulted and a thoracentesis was done removing 750 cc of fluids  Patient tolerated procedure well, getting 2250 on IS and pain conctrolled  Will be discharged home with Follow up in 2 weeks and a repeat CXR,    Significant Findings, Care, Treatment and Services Provided: Xr Chest 1 View Portable    Result Date: 3/1/2019  Impression: Minimally displaced left 8th and 9th lateral rib fractures  No pneumothorax  Left basilar airspace disease likely subsegmental atelectasis  Differential includes pulmonary contusion and/or aspiration among other etiologies  Small left pleural effusion/hemothorax    Workstation performed: [id number]     Xr Chest Pa & Lateral    Result Date: 3/2/2019  Impression: Minimal improvement in left-sided pleural effusion, status post thoracentesis  Workstation performed: [id number]     Xr Chest Pa & Lateral (24 Hours After Admission)    Result Date: 3/1/2019  Impression: Stable left pleural effusion and left lung base atelectasis  Workstation performed: [id number]     Ct Head Without Contrast    Result Date: 2/28/2019  Impression: No acute intracranial abnormality  Workstation performed: [id number]     Ct Cervical Spine Without Contrast    Result Date: 2/28/2019  Impression: 1  No cervical spine fracture or traumatic malalignment  Cervical spine degenerative changes  2   Partially visualized left pleural hyperdense collection may represent hemothorax  CT chest abdomen pelvis report  Workstation performed: [id number]     Ir Thoracentesis    Result Date: 3/1/2019  Impression: Impression: Successful ultrasound-guided left thoracentesis  Workstation performed: [id number]     Ct Chest Abdomen Pelvis W Contrast    Result Date: 2/28/2019  Impression: 1  Minimally displaced fractures of the left posterior lateral 8th and 9th ribs  There is a left-sided hemopneumothorax and left basilar opacity  Left basilar opacity has appearance most suggestive atelectasis    Pulmonary contusion is less likely given the appearance  2   No evident pneumothorax  3   Bilateral nonobstructing renal calculi  4   Colonic diverticulosis without evidence of acute diverticulitis  The study was marked in [facility] for immediate notification  Workstation performed: [id number]     Ct Recon Only Thoracolumbar    Result Date: 2/28/2019  Impression: No fracture or traumatic subluxation  Workstation performed: [id number]       Complications: none    Discharge Diagnosis: S/P Fall  Pleural effusion  Multiple rib fractures    Resolved Problems  Date Reviewed: 3/2/2019    None          Condition at Discharge: stable         Discharge instructions/Information to patient and family:   See after visit summary for information provided to patient and family  Provisions for Follow-Up Care:  See after visit summary for information related to follow-up care and any pertinent home health orders  PCP: Vernell Neville DO    Disposition: Home    Planned Readmission: No    Discharge Statement   I spent 30 minutes discharging the patient  This time was spent on the day of discharge  I had direct contact with the patient on the day of discharge  Additional documentation is required if more than 30 minutes were spent on discharge  Discharge Medications:  See after visit summary for reconciled discharge medications provided to patient and family

## 2019-03-02 NOTE — PHYSICAL THERAPY NOTE
Physical Therapy Evaluation    Patient Name: Ron Abreu    UTJDK'G Date: 3/2/2019     Problem List  Patient Active Problem List   Diagnosis    Closed fracture of multiple ribs of left side    Hemothorax on left        Past Medical History  Past Medical History:   Diagnosis Date    Arthritis         Past Surgical History  Past Surgical History:   Procedure Laterality Date    HERNIA REPAIR      IR THORACENTESIS  3/1/2019    JOINT REPLACEMENT           03/02/19 1010   Note Type   Note type Eval only   Pain Assessment   Pain Assessment No/denies pain   Pain Score No Pain   Home Living   Type of [address] Two level;Bed/bath upstairs;Stairs to enter with rails  (3 ERMIAS)   Home Equipment   (none )   Additional Comments I PTA   Prior Function   Level of [place] Independent with ADLs and functional mobility   Lives With Spouse   Receives Help From Family   ADL Assistance Independent   IADLs Independent   Falls in the last 6 months 1 to 4  (2 just prior to admission )   Vocational Retired   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; driving and "active"    Restrictions/Precautions   Weight Bearing Precautions Per Order No   Other Precautions Pain; Fall Risk   General   Additional Pertinent History IR for thoracentesis 3/1/19- awaiting CXR for f/u   Cognition   Overall Cognitive Status WFL   Orientation Level Oriented X4   Comments pleasant and conversational throughout eval- impulsive at times- thought to be baseline   RUE Assessment   RUE Assessment WFL   LUE Assessment   LUE Assessment WFL   RLE Assessment   RLE Assessment WFL   LLE Assessment   LLE Assessment WFL   Coordination   Movements are Fluid and Coordinated 1   Bed Mobility   Supine to Sit 5  Supervision   Transfers   Sit to Stand 5  Supervision   Additional items Verbal cues   Stand to Sit 5  Supervision   Additional items Verbal cues; Impulsive   Ambulation/Elevation   Gait pattern Inconsistent ricci;Decreased foot clearance;Narrow MARBIN   Gait Assistance 5  Supervision  (close S)   Additional items Verbal cues  (for attention to task- slowing ricci  )   Assistive Device None  (declines trial- "I wont use that thing" )   Distance 360 w/o AD- easily distracted w/ occasional LOB during distractable times- all LOB self corrected- PT providing cues and education to pay attention to task    Stair Management Assistance 5  Supervision   Additional items Verbal cues   Stair Management Technique One rail R;Alternating pattern; Foreward   Number of Stairs 10   Balance   Static Sitting Good   Static Standing Fair   Dynamic Standing Pedro Milligan 6328 -  (multiple self corrected episides of LOB )   Endurance Deficit   Endurance Deficit No   Activity Tolerance   Activity Tolerance Patient tolerated treatment well   Medical Staff Made Aware s/w trauma- Beryle Ducking    Nurse Made Aware yes- cleared for session including PT eval    Assessment   Prognosis Good   Problem List Impaired balance; Impaired judgement;Decreased safety awareness;Pain   Assessment Pt is 78 y o  male seen for PT evaluation s/p admit to [facility]/28/[facility]  [facility] fall x2 at home (one on ice and one following trip and fall on throw rug) resulting in L rib fxs 8-9and L hemothorax  Pt is s/p L thoracentesis on 3/1/19 and is awaiting f/u CXR  No headstrike on either fall per pt  PT now consulted for assessment of mobility and d/c needs  Comorbidities affecting pt's physical performance at time of assessment listed above  Personal factors affecting pt at time of IE include: steps to enter environment, steps to main bed and bath limited home support, advanced age, past experience, impulsivity at baseline; limited insight into impairments and recent fall(s)  Prior to admission, pt  Reports living w/ spouse in a [facility] w/ 3 ERMIAS and flight to main bed and bath- pt was I and not using AD at baseline    Upon evaluation, pt currently is requiring S A for bed skills; S for functional transfers and S for ambulation w/o AD w/ varied ricci ; impulsivity and w/ self corrected LOB  Pt was able to navigate flight of steps w/o physical assit and Spo2 on RA was 98% w/ ambualtion and pt w/o C/O SOB or MCCLELLAN  Pt presents functioning at near baseline and from a PT standpoint is medically cleared to d/c home when stable- PT to see for 1-2 x f/u visit if remaining hospitalized for safety and fo higher level balance activity to promote imprease safety w/ gait and transfers to prevent future falls asn hospital readmissions  anticiapte pt to d/c home w/ no needs on d/c- could consider outpt PT if pt appears to have ongoing balance deficits; however anticiapte falls are related to impulsivity and attention to task as opposed to balance deficits at this time  Pt w/ currentl w/ overall mobility deficits 2* to:  L rib pain; limited flexibility;   impaired balance; gait deviations; decreased safety awareness; impulsivity; decreased attention to task (Please find additional objective findings from PT assessment regarding body systems outlined above ) Pt will continue to benefit from skilled PT interventions to address stated impairments; to maximize functional potential; for ongoing pt/ family training; and DME needs  Pt/ family agreeable to plan and goals as stated on evaluation  Barriers to Discharge None   Goals   Patient Goals to go home    STG Expiration Date 03/05/19   Short Term Goal #1 2-3 days pt will    Short Term Goal #2 In 5 days pt will complete: 1) Bed mobility skills with indep to facilitate safe return to previous living environment 2) Functional transfers with indep  to facilitate safe return to previous living environment  3) Ambulation with least restrictive AD indep >500 even and uneven surfaces' without LOB and stable vitals for safe ambulation home/ community distances   4) Stair training up/ down flight step/s with 1 rail/s  and indep for safe access to previous living environment  5) Improve balance grades to Good 6) Improve LE strength grades by 1 to increase independence w/ transfers and gait  7) LE HEP independently  8) PT for ongoing pt and family education; DME needs and D/C planning to promote highest level of function in least restrictive environment  Treatment Day 0   Plan   Treatment/Interventions Functional transfer training;LE strengthening/ROM; Elevations; Therapeutic exercise; Endurance training;Patient/family training;Equipment eval/education; Bed mobility;Gait training;Spoke to nursing;Spoke to advanced practitioner   Recommendation   Recommendation Home with family support   Equipment Recommended   (none )   PT - OK to Discharge Yes   Modified [place] Scale   Modified [place] Scale 3   Barthel Index   Feeding 10   Bathing 0   Grooming Score 5   Dressing Score 5   Bladder Score 10   Bowels Score 10   Toilet Use Score 10   Transfers (Bed/Chair) Score 10   Mobility (Level Surface) Score 10   Stairs Score 5   Barthel Index Score 75     Ramona Collet, PT

## 2019-03-04 LAB
BACTERIA SPEC BFLD CULT: NO GROWTH
GRAM STN SPEC: NORMAL
GRAM STN SPEC: NORMAL

## 2019-03-06 NOTE — PHYSICIAN ADVISOR
Current patient class: Inpatient  The patient is currently on Hospital Day: 3 at [facility]      The patient was admitted to the hospital at 1515 on 3/1/19 for the following diagnosis:  Rib pain [R07 [id number]]       There is documentation in the medical record of an expected length of stay of at least 2 midnights  The patient is therefore expected to satisfy the 2 midnight benchmark and given the 2 midnight presumption is appropriate for INPATIENT ADMISSION  Given this expectation of a satisfying stay, CMS instructs us that the patient is most often appropriate for inpatient admission under part A provided medical necessity is documented in the chart  After review of the relevant documentation, labs, vital signs and test results, the patient is appropriate for INPATIENT ADMISSION  Admission to the hospital as an inpatient is a complex decision making process which requires the practitioner to consider the patients presenting complaint, history and physical examination and all relevant testing  With this in mind, in this case, the patient was deemed appropriate for INPATIENT ADMISSION  After review of the documentation and testing available at the time of the admission I concur with this clinical determination of medical necessity  Rationale is as follows: The patient is a 78 yrs old Male who presented to the ED at 2/28/2019  7:40 PM with a chief complaint of Rib Pain (see trauma flow sheet)     Given the need for further hospitalization, and along with the documentation of medical necessity present in the chart, the patient is appropriate for inpatient admission  The patient is expected to satisfy the 2 midnight benchmark, and will require further acute medical care  The patient does have comorbid conditions which increases the risk for significant adverse outcome  Given this the patient is appropriate for inpatient admission        The patients vitals on arrival were ED Triage Vitals   Temperature Pulse Respirations Blood Pressure SpO2   02/28/19 1945 02/28/19 1945 02/28/19 1945 02/28/19 1945 02/28/19 1945   97 9 °F (36 6 °C) 75 18 139/77 96 %      Temp Source Heart Rate Source Patient Position - Orthostatic VS BP Location FiO2 (%)   02/28/19 1945 02/28/19 1945 02/28/19 1945 02/28/19 2325 --   Oral Monitor Lying Left arm       Pain Score       02/28/19 2000       5           Past Medical History:   Diagnosis Date    Arthritis      Past Surgical History:   Procedure Laterality Date    HERNIA REPAIR      IR THORACENTESIS  3/1/2019    JOINT REPLACEMENT             Consults have been placed to:   None    Vitals:    03/01/19 1618 03/02/19 0017 03/02/19 0612 03/02/19 0754   BP: 129/77 100/60 114/67    BP Location: Left arm Right arm     Pulse: 76 63 68    Resp: 18 18 18    Temp: 98 2 °F (36 8 °C) 98 4 °F (36 9 °C) 98 4 °F (36 9 °C)    TempSrc: Oral Oral     SpO2: 96% 96% 95% 94%   Weight:           Most recent labs:    No results for input(s): WBC, HGB, HCT, PLT, K, NA, CALCIUM, BUN, CREATININE, LIPASE, AMYLASE, INR, TROPONINI, CKTOTAL, AST, ALT, ALKPHOS, BILITOT in the last 72 hours  Scheduled Meds:  Continuous Infusions:  No current facility-administered medications for this encounter  PRN Meds:      Surgical procedures (if appropriate):

## 2019-03-11 ENCOUNTER — TRANSCRIBE ORDERS (OUTPATIENT)
Dept: URGENT CARE | Facility: CLINIC | Age: 79
End: 2019-03-11

## 2019-03-11 ENCOUNTER — APPOINTMENT (OUTPATIENT)
Dept: RADIOLOGY | Facility: CLINIC | Age: 79
End: 2019-03-11
Payer: MEDICARE

## 2019-03-11 DIAGNOSIS — R07.81 RIB PAIN: ICD-10-CM

## 2019-03-11 DIAGNOSIS — J90 PLEURAL EFFUSION: Primary | ICD-10-CM

## 2019-03-11 DIAGNOSIS — J90 PLEURAL EFFUSION: ICD-10-CM

## 2019-03-11 PROCEDURE — 71046 X-RAY EXAM CHEST 2 VIEWS: CPT

## 2019-03-12 ENCOUNTER — TELEPHONE (OUTPATIENT)
Dept: SURGERY | Facility: CLINIC | Age: 79
End: 2019-03-12

## 2020-10-01 ENCOUNTER — APPOINTMENT (EMERGENCY)
Dept: CT IMAGING | Facility: HOSPITAL | Age: 80
End: 2020-10-01
Payer: MEDICARE

## 2020-10-01 ENCOUNTER — APPOINTMENT (EMERGENCY)
Dept: RADIOLOGY | Facility: HOSPITAL | Age: 80
End: 2020-10-01
Payer: MEDICARE

## 2020-10-01 ENCOUNTER — HOSPITAL ENCOUNTER (EMERGENCY)
Facility: HOSPITAL | Age: 80
Discharge: HOME | End: 2020-10-01
Attending: EMERGENCY MEDICINE
Payer: MEDICARE

## 2020-10-01 VITALS
RESPIRATION RATE: 19 BRPM | HEART RATE: 52 BPM | DIASTOLIC BLOOD PRESSURE: 71 MMHG | TEMPERATURE: 97.6 F | OXYGEN SATURATION: 97 % | WEIGHT: 163.14 LBS | BODY MASS INDEX: 24.09 KG/M2 | SYSTOLIC BLOOD PRESSURE: 169 MMHG

## 2020-10-01 DIAGNOSIS — N20.0 KIDNEY STONE ON RIGHT SIDE: Primary | ICD-10-CM

## 2020-10-01 LAB
ALBUMIN SERPL BCP-MCNC: 3.7 G/DL (ref 3.5–5)
ALP SERPL-CCNC: 52 U/L (ref 46–116)
ALT SERPL W P-5'-P-CCNC: 22 U/L (ref 12–78)
ANION GAP SERPL CALCULATED.3IONS-SCNC: 12 MMOL/L (ref 4–13)
APTT PPP: 34 SECONDS (ref 23–37)
AST SERPL W P-5'-P-CCNC: 17 U/L (ref 5–45)
BASOPHILS # BLD AUTO: 0.03 THOUSANDS/ΜL (ref 0–0.1)
BASOPHILS NFR BLD AUTO: 0 % (ref 0–1)
BILIRUB SERPL-MCNC: 0.6 MG/DL (ref 0.2–1)
BUN SERPL-MCNC: 19 MG/DL (ref 5–25)
CALCIUM SERPL-MCNC: 8.6 MG/DL (ref 8.3–10.1)
CHLORIDE SERPL-SCNC: 106 MMOL/L (ref 100–108)
CO2 SERPL-SCNC: 22 MMOL/L (ref 21–32)
CREAT SERPL-MCNC: 1.26 MG/DL (ref 0.6–1.3)
EOSINOPHIL # BLD AUTO: 0.13 THOUSAND/ΜL (ref 0–0.61)
EOSINOPHIL NFR BLD AUTO: 1 % (ref 0–6)
ERYTHROCYTE [DISTWIDTH] IN BLOOD BY AUTOMATED COUNT: 13.4 % (ref 11.6–15.1)
GFR SERPL CREATININE-BSD FRML MDRD: 54 ML/MIN/1.73SQ M
GLUCOSE SERPL-MCNC: 106 MG/DL (ref 65–140)
HCT VFR BLD AUTO: 48.8 % (ref 36.5–49.3)
HGB BLD-MCNC: 16 G/DL (ref 12–17)
IMM GRANULOCYTES # BLD AUTO: 0.03 THOUSAND/UL (ref 0–0.2)
IMM GRANULOCYTES NFR BLD AUTO: 0 % (ref 0–2)
INR PPP: 1.04 (ref 0.84–1.19)
LIPASE SERPL-CCNC: 120 U/L (ref 73–393)
LYMPHOCYTES # BLD AUTO: 2.61 THOUSANDS/ΜL (ref 0.6–4.47)
LYMPHOCYTES NFR BLD AUTO: 24 % (ref 14–44)
MAGNESIUM SERPL-MCNC: 1.8 MG/DL (ref 1.6–2.6)
MCH RBC QN AUTO: 30.9 PG (ref 26.8–34.3)
MCHC RBC AUTO-ENTMCNC: 32.8 G/DL (ref 31.4–37.4)
MCV RBC AUTO: 94 FL (ref 82–98)
MONOCYTES # BLD AUTO: 0.78 THOUSAND/ΜL (ref 0.17–1.22)
MONOCYTES NFR BLD AUTO: 7 % (ref 4–12)
NEUTROPHILS # BLD AUTO: 7.2 THOUSANDS/ΜL (ref 1.85–7.62)
NEUTS SEG NFR BLD AUTO: 68 % (ref 43–75)
NRBC BLD AUTO-RTO: 0 /100 WBCS
PLATELET # BLD AUTO: 300 THOUSANDS/UL (ref 149–390)
PMV BLD AUTO: 9.6 FL (ref 8.9–12.7)
POTASSIUM SERPL-SCNC: 4 MMOL/L (ref 3.5–5.3)
PROT SERPL-MCNC: 7.1 G/DL (ref 6.4–8.2)
PROTHROMBIN TIME: 13.4 SECONDS (ref 11.6–14.5)
RBC # BLD AUTO: 5.18 MILLION/UL (ref 3.88–5.62)
SODIUM SERPL-SCNC: 140 MMOL/L (ref 136–145)
TROPONIN I SERPL-MCNC: <0.02 NG/ML
WBC # BLD AUTO: 10.78 THOUSAND/UL (ref 4.31–10.16)

## 2020-10-01 PROCEDURE — 99285 EMERGENCY DEPT VISIT HI MDM: CPT

## 2020-10-01 PROCEDURE — 83690 ASSAY OF LIPASE: CPT | Performed by: EMERGENCY MEDICINE

## 2020-10-01 PROCEDURE — 36415 COLL VENOUS BLD VENIPUNCTURE: CPT | Performed by: EMERGENCY MEDICINE

## 2020-10-01 PROCEDURE — 85025 COMPLETE CBC W/AUTO DIFF WBC: CPT | Performed by: EMERGENCY MEDICINE

## 2020-10-01 PROCEDURE — 71045 X-RAY EXAM CHEST 1 VIEW: CPT

## 2020-10-01 PROCEDURE — 74177 CT ABD & PELVIS W/CONTRAST: CPT

## 2020-10-01 PROCEDURE — 93005 ELECTROCARDIOGRAM TRACING: CPT

## 2020-10-01 PROCEDURE — 84484 ASSAY OF TROPONIN QUANT: CPT | Performed by: EMERGENCY MEDICINE

## 2020-10-01 PROCEDURE — 96374 THER/PROPH/DIAG INJ IV PUSH: CPT

## 2020-10-01 PROCEDURE — 85730 THROMBOPLASTIN TIME PARTIAL: CPT | Performed by: EMERGENCY MEDICINE

## 2020-10-01 PROCEDURE — G1004 CDSM NDSC: HCPCS

## 2020-10-01 PROCEDURE — 99284 EMERGENCY DEPT VISIT MOD MDM: CPT | Performed by: EMERGENCY MEDICINE

## 2020-10-01 PROCEDURE — 80053 COMPREHEN METABOLIC PANEL: CPT | Performed by: EMERGENCY MEDICINE

## 2020-10-01 PROCEDURE — 83735 ASSAY OF MAGNESIUM: CPT | Performed by: EMERGENCY MEDICINE

## 2020-10-01 PROCEDURE — 85610 PROTHROMBIN TIME: CPT | Performed by: EMERGENCY MEDICINE

## 2020-10-01 RX ORDER — SODIUM CHLORIDE 9 MG/ML
3 INJECTION INTRAVENOUS
Status: DISCONTINUED | OUTPATIENT
Start: 2020-10-01 | End: 2020-10-01 | Stop reason: HOSPADM

## 2020-10-01 RX ORDER — KETOROLAC TROMETHAMINE 30 MG/ML
15 INJECTION, SOLUTION INTRAMUSCULAR; INTRAVENOUS ONCE
Status: COMPLETED | OUTPATIENT
Start: 2020-10-01 | End: 2020-10-01

## 2020-10-01 RX ORDER — ONDANSETRON 2 MG/ML
4 INJECTION INTRAMUSCULAR; INTRAVENOUS ONCE
Status: DISCONTINUED | OUTPATIENT
Start: 2020-10-01 | End: 2020-10-01

## 2020-10-01 RX ORDER — MORPHINE SULFATE 4 MG/ML
4 INJECTION, SOLUTION INTRAMUSCULAR; INTRAVENOUS ONCE
Status: DISCONTINUED | OUTPATIENT
Start: 2020-10-01 | End: 2020-10-01

## 2020-10-01 RX ORDER — TAMSULOSIN HYDROCHLORIDE 0.4 MG/1
0.4 CAPSULE ORAL ONCE
Status: COMPLETED | OUTPATIENT
Start: 2020-10-01 | End: 2020-10-01

## 2020-10-01 RX ORDER — TAMSULOSIN HYDROCHLORIDE 0.4 MG/1
0.4 CAPSULE ORAL
Qty: 12 CAPSULE | Refills: 0 | Status: SHIPPED | OUTPATIENT
Start: 2020-10-01 | End: 2020-10-13

## 2020-10-01 RX ADMIN — TAMSULOSIN HYDROCHLORIDE 0.4 MG: 0.4 CAPSULE ORAL at 05:51

## 2020-10-01 RX ADMIN — IOHEXOL 100 ML: 350 INJECTION, SOLUTION INTRAVENOUS at 04:55

## 2020-10-01 RX ADMIN — KETOROLAC TROMETHAMINE 15 MG: 30 INJECTION, SOLUTION INTRAMUSCULAR at 04:59

## 2020-10-02 LAB
ATRIAL RATE: 300 BPM
QRS AXIS: -41 DEGREES
QRSD INTERVAL: 84 MS
QT INTERVAL: 410 MS
QTC INTERVAL: 410 MS
T WAVE AXIS: -33 DEGREES
VENTRICULAR RATE: 60 BPM

## 2020-10-02 PROCEDURE — 93010 ELECTROCARDIOGRAM REPORT: CPT | Performed by: INTERNAL MEDICINE
